# Patient Record
Sex: FEMALE | Race: WHITE | NOT HISPANIC OR LATINO | Employment: FULL TIME | URBAN - NONMETROPOLITAN AREA
[De-identification: names, ages, dates, MRNs, and addresses within clinical notes are randomized per-mention and may not be internally consistent; named-entity substitution may affect disease eponyms.]

---

## 2020-01-12 ENCOUNTER — HOSPITAL ENCOUNTER (EMERGENCY)
Facility: HOSPITAL | Age: 25
Discharge: HOME | End: 2020-01-12
Attending: EMERGENCY MEDICINE | Admitting: EMERGENCY MEDICINE

## 2020-01-12 VITALS
DIASTOLIC BLOOD PRESSURE: 90 MMHG | SYSTOLIC BLOOD PRESSURE: 130 MMHG | RESPIRATION RATE: 16 BRPM | HEIGHT: 62 IN | TEMPERATURE: 98 F | BODY MASS INDEX: 17.44 KG/M2 | WEIGHT: 94.8 LBS | HEART RATE: 97 BPM | OXYGEN SATURATION: 99 %

## 2020-01-12 DIAGNOSIS — S01.112A LACERATION OF LEFT EYEBROW, INITIAL ENCOUNTER: Primary | ICD-10-CM

## 2020-01-12 PROCEDURE — 99282 EMERGENCY DEPT VISIT SF MDM: CPT

## 2020-01-12 PROCEDURE — 25010000003 LIDOCAINE 1 % SOLUTION: Performed by: PHYSICIAN ASSISTANT

## 2020-01-12 RX ORDER — BACITRACIN ZINC 500 [USP'U]/G
OINTMENT TOPICAL ONCE
Status: COMPLETED | OUTPATIENT
Start: 2020-01-12 | End: 2020-01-12

## 2020-01-12 RX ORDER — LIDOCAINE HYDROCHLORIDE 10 MG/ML
10 INJECTION, SOLUTION INFILTRATION; PERINEURAL ONCE
Status: COMPLETED | OUTPATIENT
Start: 2020-01-12 | End: 2020-01-12

## 2020-01-12 RX ADMIN — BACITRACIN ZINC: 500 OINTMENT TOPICAL at 11:03

## 2020-01-12 RX ADMIN — LIDOCAINE HYDROCHLORIDE 10 ML: 10 INJECTION, SOLUTION INFILTRATION; PERINEURAL at 10:12

## 2020-01-12 NOTE — ED PROVIDER NOTES
Subjective   Patient is here with complaint of laceration above the left eye eyebrow area that occurred a day ago, no LOC accidentally hit it on the corner of a table no nausea vomiting no systemic complaints presents here for evaluation patient feels she is current with her tetanus status      History provided by:  Patient      Review of Systems   Constitutional: Negative.    HENT: Negative.    Eyes: Negative.    Respiratory: Negative.    Cardiovascular: Negative.    Gastrointestinal: Negative.    Genitourinary: Negative.    Skin: Positive for wound.   Neurological: Negative.    Psychiatric/Behavioral: Negative.    All other systems reviewed and are negative.      History reviewed. No pertinent past medical history.    No Known Allergies    History reviewed. No pertinent surgical history.    History reviewed. No pertinent family history.    Social History     Socioeconomic History   • Marital status: Single     Spouse name: Not on file   • Number of children: Not on file   • Years of education: Not on file   • Highest education level: Not on file   Tobacco Use   • Smoking status: Never Smoker   Substance and Sexual Activity   • Alcohol use: Yes     Comment: occassional   • Drug use: Never           Objective   Physical Exam   Constitutional: She is oriented to person, place, and time. She appears well-developed and well-nourished. No distress.   Afebrile nontoxic no acute distress   HENT:   Head: Normocephalic.   Mouth/Throat: Oropharynx is clear and moist.   Eyes: Pupils are equal, round, and reactive to light. Conjunctivae and EOM are normal.   Neck: Normal range of motion. Neck supple.   Cardiovascular: Normal rate and intact distal pulses.   Pulmonary/Chest: Effort normal and breath sounds normal.   Musculoskeletal: Normal range of motion.   Neurological: She is alert and oriented to person, place, and time. No cranial nerve deficit or sensory deficit. She exhibits normal muscle tone. Coordination normal.    Skin: Skin is warm and dry. Capillary refill takes less than 2 seconds. She is not diaphoretic.    1.5 cm superficial laceration left eyebrow   Psychiatric: She has a normal mood and affect. Her behavior is normal. Judgment and thought content normal.   Nursing note and vitals reviewed.      Laceration Repair  Date/Time: 1/12/2020 10:41 AM  Performed by: Vincent Gaston PA-C  Authorized by: Vincent Harris DO     Consent:     Consent obtained:  Verbal    Consent given by:  Patient  Anesthesia (see MAR for exact dosages):     Anesthesia method:  Local infiltration    Local anesthetic:  Lidocaine 1% w/o epi  Laceration details:     Location:  Face    Face location:  L eyebrow    Length (cm):  1.5    Depth (mm):  1  Repair type:     Repair type:  Simple  Pre-procedure details:     Preparation:  Patient was prepped and draped in usual sterile fashion  Treatment:     Area cleansed with:  Saline    Irrigation solution:  Sterile saline    Irrigation volume:  10    Irrigation method:  Syringe    Visualized foreign bodies/material removed: no    Skin repair:     Repair method:  Sutures    Suture size:  6-0    Suture material:  Nylon    Suture technique:  Simple interrupted    Number of sutures:  5  Approximation:     Approximation:  Close  Post-procedure details:     Dressing:  Antibiotic ointment               ED Course                                               MDM  Number of Diagnoses or Management Options     Amount and/or Complexity of Data Reviewed  Review and summarize past medical records: yes  Discuss the patient with other providers: yes    Risk of Complications, Morbidity, and/or Mortality  Presenting problems: low  Diagnostic procedures: low  Management options: low        Final diagnoses:   Laceration of left eyebrow, initial encounter            Vincent Gaston PA-C  01/12/20 1045

## 2020-03-30 ENCOUNTER — OFFICE VISIT (OUTPATIENT)
Dept: INTERNAL MEDICINE | Facility: CLINIC | Age: 25
End: 2020-03-30

## 2020-03-30 VITALS
DIASTOLIC BLOOD PRESSURE: 90 MMHG | WEIGHT: 95.12 LBS | BODY MASS INDEX: 17.51 KG/M2 | HEIGHT: 62 IN | HEART RATE: 113 BPM | OXYGEN SATURATION: 99 % | SYSTOLIC BLOOD PRESSURE: 125 MMHG | TEMPERATURE: 99.1 F

## 2020-03-30 DIAGNOSIS — Z86.19 FREQUENT INFECTIONS: ICD-10-CM

## 2020-03-30 DIAGNOSIS — I10 ESSENTIAL HYPERTENSION: Primary | ICD-10-CM

## 2020-03-30 DIAGNOSIS — R59.1 LYMPHADENOPATHY OF HEAD AND NECK: ICD-10-CM

## 2020-03-30 DIAGNOSIS — Z13.220 LIPID SCREENING: ICD-10-CM

## 2020-03-30 PROCEDURE — 99204 OFFICE O/P NEW MOD 45 MIN: CPT | Performed by: FAMILY MEDICINE

## 2020-03-30 RX ORDER — HYDROCHLOROTHIAZIDE 12.5 MG/1
12.5 TABLET ORAL DAILY
Qty: 30 TABLET | Refills: 2 | Status: SHIPPED | OUTPATIENT
Start: 2020-03-30 | End: 2020-08-14 | Stop reason: SDDI

## 2020-03-30 RX ORDER — NORGESTIMATE AND ETHINYL ESTRADIOL 7DAYSX3 28
KIT ORAL
COMMUNITY
Start: 2020-02-26 | End: 2022-06-09

## 2020-03-30 NOTE — PROGRESS NOTES
"Susan Dickinson is a 24 y.o. female.    Chief Complaint   Patient presents with   • Hypertension       HPI   Patient presents today to establish care.  Patient reports frequent infections.  Patient particularly mentions frequent GI infections and UTI's. 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  She was hospitalized for 4 days in 2015 at  and received IV antibiotics.  She reports she has been sick off and on and \"catches everything under the sun\".  She does work in a  as well and has done so for 2 years.  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  Patient also reports lymphadenopathy.    Patient also reports a h/o elevated BP.  She reports at an ER visit her BP was 140/90.  However, in our records, it shows 130/90.  She has not been worked up for renal artery stenosis in the past and has noticed elevated BP for years.  She does have mild headaches from times to time.      The following portions of the patient's history were reviewed and updated as appropriate: allergies, current medications, past family history, past medical history, past social history, past surgical history and problem list.     Past Medical History:   Diagnosis Date   • Sepsis (CMS/[facility])    • TSS (toxic shock syndrome) (CMS/[facility])        History reviewed. No pertinent surgical history.    Family History   Problem Relation Age of Onset   • Hypertension Mother    • Heart attack Father 53   • Hypertension Father    • Stroke Maternal Grandmother    • Hypertension Maternal Grandmother    • Hyperlipidemia Maternal Grandmother    • Heart attack Maternal Grandfather        Social History     Socioeconomic History   • Marital status: Single     Spouse name: Not on file   • Number of children: Not on file   • Years of education: Not on file   • Highest education level: Not on file   Tobacco Use   • Smoking status: Never Smoker   • Smokeless tobacco: Never Used   Substance and Sexual Activity   • Alcohol use: Yes     Comment: " "occassional   • Drug use: Never   • Sexual activity: Not Currently     Partners: Male     Birth control/protection: OCP       No Known Allergies      Current Outpatient Medications:   •  hydroCHLOROthiazide (HYDRODIURIL) 12.5 MG tablet, Take 1 tablet by mouth Daily., Disp: 30 tablet, Rfl: 2  •  TRI-SPRINTEC 0.18/0.215/0.25 MG-35 MCG per tablet, , Disp: , Rfl:     ROS    Review of Systems   Constitutional: Negative for chills, fatigue and fever.   HENT: Negative for congestion, postnasal drip and sore throat.    Eyes: Negative for blurred vision and visual disturbance.   Respiratory: Negative for cough, shortness of breath and wheezing.    Cardiovascular: Negative for chest pain and leg swelling.   Gastrointestinal: Negative for abdominal pain, constipation, diarrhea, nausea and vomiting.   Endocrine: Negative for cold intolerance and heat intolerance.   Genitourinary: Negative for dysuria and frequency.   Musculoskeletal: Negative for arthralgias and back pain.   Skin: Negative for color change and rash.   Allergic/Immunologic: Negative for environmental allergies.   Neurological: Positive for headache. Negative for weakness and numbness.   Hematological: Positive for adenopathy. Does not bruise/bleed easily.   Psychiatric/Behavioral: Negative for sleep disturbance and depressed mood. The patient is not nervous/anxious.        Vitals:    03/30/20 1439   BP: 125/90   BP Location: Left arm   Patient Position: Sitting   Cuff Size: Pediatric   Pulse: 113   Temp: 99.1 °F (37.3 °C)   TempSrc: Temporal   SpO2: 99%   Weight: 43.1 kg (95 lb 1.9 oz)   Height: 157.5 cm (62\")     Body mass index is 17.4 kg/m².    Physical Exam     Physical Exam   Constitutional: She is oriented to person, place, and time. She appears well-developed and well-nourished. No distress.   HENT:   Head: Normocephalic and atraumatic.   Right Ear: Tympanic membrane and external ear normal.   Left Ear: Tympanic membrane and external ear normal. "   Mouth/Throat: Oropharynx is clear and moist.   Eyes: Pupils are equal, round, and reactive to light. Conjunctivae and EOM are normal.   Neck: Normal range of motion. Neck supple.   Cardiovascular: Normal rate and regular rhythm.   No murmur heard.  Pulmonary/Chest: Effort normal and breath sounds normal. No respiratory distress. She has no wheezes.   Abdominal: Soft. Bowel sounds are normal. She exhibits no distension. There is no tenderness.   Musculoskeletal: Normal range of motion. She exhibits no edema.   Lymphadenopathy:     She has cervical adenopathy.        Left cervical: Superficial cervical adenopathy present.   Neurological: She is alert and oriented to person, place, and time. She displays normal reflexes. No cranial nerve deficit.   Skin: Skin is warm and dry.   Psychiatric: She has a normal mood and affect. Her behavior is normal.       Assessment/Plan    Problem List Items Addressed This Visit        Cardiovascular and Mediastinum    Essential hypertension - Primary    Relevant Medications    hydroCHLOROthiazide (HYDRODIURIL) 12.5 MG tablet    Other Relevant Orders    CBC & Differential    Comprehensive Metabolic Panel    TSH      Other Visit Diagnoses     Frequent infections        Relevant Orders    RADHA With / DsDNA, RNP, Sjogrens A / B, Smith    IgA    Lipid screening        Relevant Orders    Lipid Panel    Lymphadenopathy of head and neck        Relevant Orders    CBC & Differential    RADHA With / DsDNA, RNP, Sjogrens A / B, Castro    IgA        Patient is being started on HCTZ for BP.  Advised to take in the morning.  Encouraged to log BP at home for the next 2 months.  Advised I would like to obtain a renal ultrasound to rule out renal artery stenosis as a cause of HTN, but this will be delayed due to COVID 19 outbreak. Will obtain blood work for further evaluation of symptoms/frequent infections.  Will also obtain lipid screening as father  at age 53 from an MI.  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 Advised if she is concerned about bringing home COVID 19 from , or any other infection, at this time, she may want to obtain a letter from her grandmother's physician.  Will see back for physical with pap in 2 months.     New Medications Ordered This Visit   Medications   • hydroCHLOROthiazide (HYDRODIURIL) 12.5 MG tablet     Sig: Take 1 tablet by mouth Daily.     Dispense:  30 tablet     Refill:  2       No orders of the defined types were placed in this encounter.      Return in about 2 months (around 5/30/2020) for HTN, , Annual with pap.      Nubia Syed, DO

## 2020-03-31 LAB
ALBUMIN SERPL-MCNC: 4.5 G/DL (ref 3.5–5.2)
ALBUMIN/GLOB SERPL: 1.8 G/DL
ALP SERPL-CCNC: 57 U/L (ref 39–117)
ALT SERPL-CCNC: 11 U/L (ref 1–33)
ANA SER QL: NEGATIVE
AST SERPL-CCNC: 11 U/L (ref 1–32)
BASOPHILS # BLD AUTO: 0.07 10*3/MM3 (ref 0–0.2)
BASOPHILS NFR BLD AUTO: 0.6 % (ref 0–1.5)
BILIRUB SERPL-MCNC: 0.4 MG/DL (ref 0.2–1.2)
BUN SERPL-MCNC: 11 MG/DL (ref 6–20)
BUN/CREAT SERPL: 13.8 (ref 7–25)
CALCIUM SERPL-MCNC: 9.7 MG/DL (ref 8.6–10.5)
CHLORIDE SERPL-SCNC: 104 MMOL/L (ref 98–107)
CHOLEST SERPL-MCNC: 175 MG/DL (ref 0–200)
CO2 SERPL-SCNC: 26.1 MMOL/L (ref 22–29)
CREAT SERPL-MCNC: 0.8 MG/DL (ref 0.57–1)
EOSINOPHIL # BLD AUTO: 0.05 10*3/MM3 (ref 0–0.4)
EOSINOPHIL NFR BLD AUTO: 0.4 % (ref 0.3–6.2)
ERYTHROCYTE [DISTWIDTH] IN BLOOD BY AUTOMATED COUNT: 13.6 % (ref 12.3–15.4)
GLOBULIN SER CALC-MCNC: 2.5 GM/DL
GLUCOSE SERPL-MCNC: 82 MG/DL (ref 65–99)
HCT VFR BLD AUTO: 38.4 % (ref 34–46.6)
HDLC SERPL-MCNC: 65 MG/DL (ref 40–60)
HGB BLD-MCNC: 13 G/DL (ref 12–15.9)
IGA SERPL-MCNC: 125 MG/DL (ref 87–352)
IMM GRANULOCYTES # BLD AUTO: 0.03 10*3/MM3 (ref 0–0.05)
IMM GRANULOCYTES NFR BLD AUTO: 0.2 % (ref 0–0.5)
LDLC SERPL CALC-MCNC: 81 MG/DL (ref 0–100)
LYMPHOCYTES # BLD AUTO: 1.85 10*3/MM3 (ref 0.7–3.1)
LYMPHOCYTES NFR BLD AUTO: 15.1 % (ref 19.6–45.3)
MCH RBC QN AUTO: 29 PG (ref 26.6–33)
MCHC RBC AUTO-ENTMCNC: 33.9 G/DL (ref 31.5–35.7)
MCV RBC AUTO: 85.5 FL (ref 79–97)
MONOCYTES # BLD AUTO: 0.53 10*3/MM3 (ref 0.1–0.9)
MONOCYTES NFR BLD AUTO: 4.3 % (ref 5–12)
NEUTROPHILS # BLD AUTO: 9.74 10*3/MM3 (ref 1.7–7)
NEUTROPHILS NFR BLD AUTO: 79.4 % (ref 42.7–76)
NRBC BLD AUTO-RTO: 0 /100 WBC (ref 0–0.2)
PLATELET # BLD AUTO: 226 10*3/MM3 (ref 140–450)
POTASSIUM SERPL-SCNC: 3.9 MMOL/L (ref 3.5–5.2)
PROT SERPL-MCNC: 7 G/DL (ref 6–8.5)
RBC # BLD AUTO: 4.49 10*6/MM3 (ref 3.77–5.28)
SODIUM SERPL-SCNC: 143 MMOL/L (ref 136–145)
TRIGL SERPL-MCNC: 143 MG/DL (ref 0–150)
TSH SERPL DL<=0.005 MIU/L-ACNC: 1.7 UIU/ML (ref 0.27–4.2)
VLDLC SERPL CALC-MCNC: 28.6 MG/DL
WBC # BLD AUTO: 12.27 10*3/MM3 (ref 3.4–10.8)

## 2020-04-01 ENCOUNTER — TELEPHONE (OUTPATIENT)
Dept: INTERNAL MEDICINE | Facility: CLINIC | Age: 25
End: 2020-04-01

## 2020-04-02 RX ORDER — AMOXICILLIN AND CLAVULANATE POTASSIUM 875; 125 MG/1; MG/1
1 TABLET, FILM COATED ORAL EVERY 12 HOURS SCHEDULED
Qty: 20 TABLET | Refills: 0 | Status: SHIPPED | OUTPATIENT
Start: 2020-04-02 | End: 2020-08-14

## 2020-04-27 ENCOUNTER — E-VISIT (OUTPATIENT)
Dept: INTERNAL MEDICINE | Facility: CLINIC | Age: 25
End: 2020-04-27

## 2020-04-27 DIAGNOSIS — B37.31 VAGINAL CANDIDIASIS: Primary | ICD-10-CM

## 2020-04-27 PROCEDURE — 99421 OL DIG E/M SVC 5-10 MIN: CPT | Performed by: FAMILY MEDICINE

## 2020-04-28 PROBLEM — B37.31 VAGINAL CANDIDIASIS: Status: ACTIVE | Noted: 2020-04-28

## 2020-04-28 RX ORDER — FLUCONAZOLE 150 MG/1
150 TABLET ORAL ONCE
Qty: 1 TABLET | Refills: 0 | Status: SHIPPED | OUTPATIENT
Start: 2020-04-28 | End: 2020-04-28

## 2020-04-28 NOTE — PROGRESS NOTES
Susan Dickinson    1995  [id number]    I have reviewed the e-Visit questionnaire and patient's answers, my assessment and plan are as follows:    HPI  Patient complains of a less than 2 day h/o change in urine, vagina discharge that is white and like cottage cheese, vaginal odor.  Denies any dysuria, but admits to dyspareunia. It does not appear she has currently tried anything.      Review of Systems - Genito-Urinary ROS: positive for - dyspareunia, genital discharge and vaginal odor      Diagnoses and all orders for this visit:    Vaginal candidiasis    Other orders  -     fluconazole (Diflucan) 150 MG tablet; Take 1 tablet by mouth 1 (One) Time for 1 dose.    Patient likely has a vaginal yeast infection.  Will treat with diflucan for 1 dose.      Any medications prescribed have been sent electronically to   [id number] - [place], [place] - [address] - [phone]  - [phone] FX  [address] [place] [address]  Phone: [phone] Fax: [phone]      5 minutes were spent reviewing the patient's questionnaire, formulating a treatment plan, and relaying information to the patient via [employer].    Nubia Syed DO  04/28/20  8:23 AM

## 2020-05-05 RX ORDER — FLUCONAZOLE 150 MG/1
150 TABLET ORAL EVERY OTHER DAY
Qty: 2 TABLET | Refills: 0 | Status: SHIPPED | OUTPATIENT
Start: 2020-05-05 | End: 2020-08-14

## 2020-08-14 ENCOUNTER — RESULTS ENCOUNTER (OUTPATIENT)
Dept: INTERNAL MEDICINE | Facility: CLINIC | Age: 25
End: 2020-08-14

## 2020-08-14 ENCOUNTER — OFFICE VISIT (OUTPATIENT)
Dept: INTERNAL MEDICINE | Facility: CLINIC | Age: 25
End: 2020-08-14

## 2020-08-14 VITALS
OXYGEN SATURATION: 100 % | TEMPERATURE: 98.7 F | HEIGHT: 62 IN | BODY MASS INDEX: 18 KG/M2 | RESPIRATION RATE: 16 BRPM | SYSTOLIC BLOOD PRESSURE: 136 MMHG | HEART RATE: 111 BPM | DIASTOLIC BLOOD PRESSURE: 89 MMHG | WEIGHT: 97.8 LBS

## 2020-08-14 DIAGNOSIS — Z11.3 SCREEN FOR STD (SEXUALLY TRANSMITTED DISEASE): ICD-10-CM

## 2020-08-14 DIAGNOSIS — I10 ESSENTIAL HYPERTENSION: Primary | ICD-10-CM

## 2020-08-14 PROBLEM — B37.31 VAGINAL CANDIDIASIS: Status: RESOLVED | Noted: 2020-04-28 | Resolved: 2020-08-14

## 2020-08-14 PROCEDURE — 99213 OFFICE O/P EST LOW 20 MIN: CPT | Performed by: FAMILY MEDICINE

## 2020-08-14 RX ORDER — HYDROCHLOROTHIAZIDE 12.5 MG/1
12.5 TABLET ORAL DAILY
Qty: 30 TABLET | Refills: 5 | Status: SHIPPED | OUTPATIENT
Start: 2020-08-14 | End: 2020-11-02 | Stop reason: SDUPTHER

## 2020-08-14 NOTE — PROGRESS NOTES
Susan Dickinson is a 24 y.o. female.    Chief Complaint   Patient presents with   • Hypertension     patient states she stopped taking the Hydrochlorohiazide x 2 months ago   • STD testing     patient states she was informed by her ex-boyfriend that he had tested posted for STDs, patient states she has not been with him since 02/2020       HPI   Patient presents today requesting to be screened for potential STD's.  Denies symptoms.  Reports she has vaginal discharge only around time of period.  She denies any vaginal pain, pelvic pain, or lesions. Ex-boyfriend recently tested positive for an STD.        Patient has hypertension.  They are taking hydrochlorothiazide.  They have been compliant with medications.  The patient denies any side effects to the medication.  Blood pressure is controlled in the office today.  Blood pressure has been running unknown.  They are following a low salt diet.  They are active.    The following portions of the patient's history were reviewed and updated as appropriate: allergies, current medications, past family history, past medical history, past social history, past surgical history and problem list.     No Known Allergies      Current Outpatient Medications:   •  TRI-SPRINTEC 0.18/0.215/0.25 MG-35 MCG per tablet, , Disp: , Rfl:   •  hydroCHLOROthiazide (HYDRODIURIL) 12.5 MG tablet, Take 1 tablet by mouth Daily., Disp: 30 tablet, Rfl: 5    ROS    Review of Systems   Constitutional: Negative for chills, fatigue and fever.   HENT: Negative for congestion, postnasal drip and sore throat.    Respiratory: Negative for cough and shortness of breath.    Cardiovascular: Negative for chest pain and palpitations.   Gastrointestinal: Negative for abdominal pain, constipation, diarrhea, nausea and vomiting.   Musculoskeletal: Negative for arthralgias and back pain.   Neurological: Negative for weakness, numbness and headache.   Psychiatric/Behavioral: Negative for depressed mood. The  "patient is not nervous/anxious.        Vitals:    08/14/20 1340   BP: 136/89   BP Location: Left arm   Patient Position: Sitting   Cuff Size: Small Adult   Pulse: 111   Resp: 16   Temp: 98.7 °F (37.1 °C)   TempSrc: Temporal   SpO2: 100%   Weight: 44.4 kg (97 lb 12.8 oz)   Height: 157.5 cm (62\")     Body mass index is 17.89 kg/m².    Physical Exam     Physical Exam   Constitutional: She is oriented to person, place, and time. She appears well-developed and well-nourished. No distress.   HENT:   Head: Normocephalic and atraumatic.   Right Ear: External ear normal.   Left Ear: External ear normal.   Eyes: Conjunctivae and EOM are normal.   Cardiovascular: Regular rhythm. Tachycardia present.   No murmur heard.  Pulmonary/Chest: Effort normal and breath sounds normal. No respiratory distress. She has no wheezes.   Abdominal: Soft. Bowel sounds are normal. She exhibits no distension. There is no tenderness.   Neurological: She is alert and oriented to person, place, and time. No cranial nerve deficit.   Skin: Skin is warm and dry.   Psychiatric: She has a normal mood and affect. Her behavior is normal.       Assessment/Plan    Problem List Items Addressed This Visit        Cardiovascular and Mediastinum    Essential hypertension - Primary     Borderline elevated due to noncompliance.  Encouraged patient to start back on HCTZ.          Relevant Medications    hydroCHLOROthiazide (HYDRODIURIL) 12.5 MG tablet      Other Visit Diagnoses     Screen for STD (sexually transmitted disease)        Relevant Orders    Hepatitis C Antibody    HIV-1 / O / 2 Ag / Antibody 4th Generation    STI/STD PANEL SWAB ([facility]) - Swab, Vagina          New Medications Ordered This Visit   Medications   • hydroCHLOROthiazide (HYDRODIURIL) 12.5 MG tablet     Sig: Take 1 tablet by mouth Daily.     Dispense:  30 tablet     Refill:  5       No orders of the defined types were placed in this encounter.      Return in about 6 months (around 2/14/2021) " for HTN.    Nubia Syed, DO

## 2020-08-15 LAB
HCV AB S/CO SERPL IA: 0.1 S/CO RATIO (ref 0–0.9)
HIV 1+2 AB+HIV1 P24 AG SERPL QL IA: NON REACTIVE

## 2020-08-18 RX ORDER — METRONIDAZOLE 500 MG/1
500 TABLET ORAL 2 TIMES DAILY
Qty: 14 TABLET | Refills: 0 | Status: SHIPPED | OUTPATIENT
Start: 2020-08-18 | End: 2020-09-16 | Stop reason: SDUPTHER

## 2020-08-18 RX ORDER — AZITHROMYCIN 500 MG/1
1000 TABLET, FILM COATED ORAL ONCE
Qty: 2 TABLET | Refills: 0 | Status: SHIPPED | OUTPATIENT
Start: 2020-08-18 | End: 2020-08-18

## 2020-09-16 RX ORDER — METRONIDAZOLE 250 MG/1
250 TABLET ORAL 3 TIMES DAILY
Qty: 21 TABLET | Refills: 0 | Status: SHIPPED | OUTPATIENT
Start: 2020-09-16 | End: 2020-09-23

## 2020-11-05 RX ORDER — HYDROCHLOROTHIAZIDE 12.5 MG/1
12.5 TABLET ORAL DAILY
Qty: 30 TABLET | Refills: 5 | Status: SHIPPED | OUTPATIENT
Start: 2020-11-05 | End: 2021-02-26

## 2021-02-26 ENCOUNTER — OFFICE VISIT (OUTPATIENT)
Dept: INTERNAL MEDICINE | Facility: CLINIC | Age: 26
End: 2021-02-26

## 2021-02-26 VITALS
DIASTOLIC BLOOD PRESSURE: 92 MMHG | SYSTOLIC BLOOD PRESSURE: 142 MMHG | BODY MASS INDEX: 20.3 KG/M2 | HEIGHT: 62 IN | HEART RATE: 114 BPM | TEMPERATURE: 98.7 F | WEIGHT: 110.3 LBS | OXYGEN SATURATION: 99 %

## 2021-02-26 DIAGNOSIS — R00.2 PALPITATIONS: ICD-10-CM

## 2021-02-26 DIAGNOSIS — I10 ESSENTIAL HYPERTENSION: Primary | ICD-10-CM

## 2021-02-26 DIAGNOSIS — R42 DIZZINESS: ICD-10-CM

## 2021-02-26 DIAGNOSIS — R00.0 TACHYCARDIA: ICD-10-CM

## 2021-02-26 PROCEDURE — 99214 OFFICE O/P EST MOD 30 MIN: CPT | Performed by: FAMILY MEDICINE

## 2021-02-26 RX ORDER — LISINOPRIL AND HYDROCHLOROTHIAZIDE 12.5; 1 MG/1; MG/1
1 TABLET ORAL DAILY
Qty: 30 TABLET | Refills: 5 | Status: SHIPPED | OUTPATIENT
Start: 2021-02-26 | End: 2021-04-06 | Stop reason: SDUPTHER

## 2021-02-26 NOTE — PROGRESS NOTES
"Susan Dickinson is a 25 y.o. female.    Chief Complaint   Patient presents with   • Hypertension       HPI   Patient has hypertension.  They are taking hydrochlorothiazide.  They have been compliant with medications.  The patient denies any side effects to the medication.  Blood pressure is not controlled in the office today.  Blood pressure has been running higher at home and at work over the last week.  It is always higher in the morning and improves after she takes her blood pressure medication around 10 AM.  She is following a low salt diet.  She is active she reports elevated HR into 160's or 120's.  She admits to palpitations.  She reports dizziness off and on.  She is drinking 2 mt dew a day and 1 tea.  She does not drink very much water.    The following portions of the patient's history were reviewed and updated as appropriate: allergies, current medications, past family history, past medical history, past social history, past surgical history and problem list.     No Known Allergies      Current Outpatient Medications:   •  TRI-SPRINTEC 0.18/0.215/0.25 MG-35 MCG per tablet, , Disp: , Rfl:   •  lisinopril-hydrochlorothiazide (Zestoretic) 10-12.5 MG per tablet, Take 1 tablet by mouth Daily., Disp: 30 tablet, Rfl: 5    ROS    Review of Systems   Constitutional: Negative for chills, fatigue and fever.   Respiratory: Positive for chest tightness. Negative for cough and shortness of breath.    Cardiovascular: Positive for palpitations. Negative for chest pain.   Gastrointestinal: Negative for abdominal pain, constipation, diarrhea, nausea and vomiting.   Psychiatric/Behavioral: Positive for stress. Negative for depressed mood. The patient is nervous/anxious.        Vitals:    02/26/21 1137   BP: 142/92   BP Location: Left arm   Patient Position: Sitting   Cuff Size: Adult   Pulse: 114   Temp: 98.7 °F (37.1 °C)   TempSrc: Temporal   SpO2: 99%   Weight: 50 kg (110 lb 4.8 oz)   Height: 157.5 cm (62\")     Body " mass index is 20.17 kg/m².    Physical Exam     Physical Exam  Constitutional:       General: She is not in acute distress.     Appearance: She is well-developed.   HENT:      Head: Normocephalic and atraumatic.      Right Ear: External ear normal.      Left Ear: External ear normal.   Eyes:      Extraocular Movements: Extraocular movements intact.      Conjunctiva/sclera: Conjunctivae normal.   Cardiovascular:      Rate and Rhythm: Regular rhythm. Tachycardia present.      Heart sounds: No murmur.   Pulmonary:      Effort: Pulmonary effort is normal. No respiratory distress.      Breath sounds: Normal breath sounds. No wheezing.   Abdominal:      General: Bowel sounds are normal. There is no distension.      Palpations: Abdomen is soft.      Tenderness: There is no abdominal tenderness.   Skin:     General: Skin is warm and dry.   Neurological:      Mental Status: She is alert and oriented to person, place, and time.      Cranial Nerves: No cranial nerve deficit.   Psychiatric:         Mood and Affect: Mood normal.         Behavior: Behavior normal.         Assessment/Plan    Problems Addressed this Visit        Cardiac and Vasculature    Essential hypertension - Primary    Relevant Medications    lisinopril-hydrochlorothiazide (Zestoretic) 10-12.5 MG per tablet    Other Relevant Orders    CBC & Differential    Comprehensive Metabolic Panel    TSH    T4, Free      Other Visit Diagnoses     Tachycardia        Relevant Orders    Holter Monitor - 72 Hour Up To 15 Days    CBC & Differential    Comprehensive Metabolic Panel    TSH    Magnesium    T4, Free    Palpitations        Relevant Orders    Holter Monitor - 72 Hour Up To 15 Days    CBC & Differential    Comprehensive Metabolic Panel    TSH    Magnesium    T4, Free    Dizziness        Relevant Orders    Holter Monitor - 72 Hour Up To 15 Days           Blood pressure is uncontrolled in office today.  Patient is being changed to lisinopril-hydrochlorothiazide.   Discussed potential side effects of the medication today.  Patient advised that she may want to try taking the medication at night.  We will also place a Holter monitor on the patient today.  She has been encouraged to wear this for 5 days.  Advised to document any episodes of tachycardia, palpitations, dizziness, etc. that she may have over the next 5 days so that we can see if that is associated with any abnormalities on the Holter monitor.  We will also obtain updated labs.    New Medications Ordered This Visit   Medications   • lisinopril-hydrochlorothiazide (Zestoretic) 10-12.5 MG per tablet     Sig: Take 1 tablet by mouth Daily.     Dispense:  30 tablet     Refill:  5       No orders of the defined types were placed in this encounter.      Return in about 1 month (around 3/26/2021) for HTN.    Nubia Syed,

## 2021-04-06 ENCOUNTER — OFFICE VISIT (OUTPATIENT)
Dept: INTERNAL MEDICINE | Facility: CLINIC | Age: 26
End: 2021-04-06

## 2021-04-06 VITALS
HEART RATE: 94 BPM | HEIGHT: 62 IN | OXYGEN SATURATION: 100 % | TEMPERATURE: 98.2 F | BODY MASS INDEX: 19.01 KG/M2 | WEIGHT: 103.3 LBS | DIASTOLIC BLOOD PRESSURE: 82 MMHG | SYSTOLIC BLOOD PRESSURE: 132 MMHG

## 2021-04-06 DIAGNOSIS — Z01.30 BLOOD PRESSURE CHECK ON ORAL CONTRACEPTIVES: Primary | ICD-10-CM

## 2021-04-06 DIAGNOSIS — Z79.3 BLOOD PRESSURE CHECK ON ORAL CONTRACEPTIVES: Primary | ICD-10-CM

## 2021-04-06 DIAGNOSIS — I10 ESSENTIAL HYPERTENSION: ICD-10-CM

## 2021-04-06 PROCEDURE — 99213 OFFICE O/P EST LOW 20 MIN: CPT | Performed by: FAMILY MEDICINE

## 2021-04-06 RX ORDER — LISINOPRIL AND HYDROCHLOROTHIAZIDE 12.5; 1 MG/1; MG/1
1 TABLET ORAL DAILY
Qty: 90 TABLET | Refills: 3 | Status: SHIPPED | OUTPATIENT
Start: 2021-04-06 | End: 2022-06-09

## 2021-04-06 NOTE — PROGRESS NOTES
"Susan Dickinson is a 25 y.o. female.    Chief Complaint   Patient presents with   • Hypertension       HPI   Patient has hypertension and is on an oral contraceptive.  They are taking lisinopril (Prinivil) and hydrochlorothiazide.  They have been compliant with medications.  The patient denies any side effects to the medication.  Blood pressure is controlled in the office today.  Blood pressure has been running unknown at work or home.  They are following a low salt diet.  They are active.    The following portions of the patient's history were reviewed and updated as appropriate: allergies, current medications, past family history, past medical history, past social history, past surgical history and problem list.     No Known Allergies      Current Outpatient Medications:   •  lisinopril-hydrochlorothiazide (Zestoretic) 10-12.5 MG per tablet, Take 1 tablet by mouth Daily., Disp: 90 tablet, Rfl: 3  •  TRI-SPRINTEC 0.18/0.215/0.25 MG-35 MCG per tablet, , Disp: , Rfl:     ROS    Review of Systems   Constitutional: Negative for chills and fever.   Eyes: Negative for visual disturbance.   Respiratory: Negative for cough and shortness of breath.    Cardiovascular: Negative for chest pain.   Gastrointestinal: Negative for abdominal pain, constipation, diarrhea, nausea and vomiting.   Neurological: Negative for headache.       Vitals:    04/06/21 0831   BP: 132/82   BP Location: Left arm   Patient Position: Sitting   Cuff Size: Adult   Pulse: 94   Temp: 98.2 °F (36.8 °C)   TempSrc: Temporal   SpO2: 100%   Weight: 46.9 kg (103 lb 4.8 oz)   Height: 157.5 cm (62\")     Body mass index is 18.89 kg/m².    Physical Exam     Physical Exam  Constitutional:       General: She is not in acute distress.     Appearance: She is well-developed.   HENT:      Head: Normocephalic and atraumatic.      Right Ear: External ear normal.      Left Ear: External ear normal.   Eyes:      Extraocular Movements: Extraocular movements intact.      " Conjunctiva/sclera: Conjunctivae normal.   Cardiovascular:      Rate and Rhythm: Normal rate and regular rhythm.      Heart sounds: No murmur heard.     Pulmonary:      Effort: Pulmonary effort is normal. No respiratory distress.      Breath sounds: Normal breath sounds. No wheezing.   Musculoskeletal:      Right lower leg: No edema.      Left lower leg: No edema.   Neurological:      Mental Status: She is alert and oriented to person, place, and time.      Cranial Nerves: No cranial nerve deficit.   Psychiatric:         Mood and Affect: Mood normal.         Behavior: Behavior normal.         Assessment/Plan    Problems Addressed this Visit        Cardiac and Vasculature    Essential hypertension    Relevant Medications    lisinopril-hydrochlorothiazide (Zestoretic) 10-12.5 MG per tablet    Blood pressure check on oral contraceptives - Primary        BP controlled in office today.  Will continue lisinopril-HCTZ.  Will continue OC as well.      New Medications Ordered This Visit   Medications   • lisinopril-hydrochlorothiazide (Zestoretic) 10-12.5 MG per tablet     Sig: Take 1 tablet by mouth Daily.     Dispense:  90 tablet     Refill:  3       No orders of the defined types were placed in this encounter.      Return in about 6 months (around 10/6/2021) for HTN.    Nubia Syed DO

## 2021-04-07 PROCEDURE — 93244 EXT ECG>48HR<7D REV&INTERPJ: CPT | Performed by: FAMILY MEDICINE

## 2021-04-23 ENCOUNTER — PATIENT MESSAGE (OUTPATIENT)
Dept: INTERNAL MEDICINE | Facility: CLINIC | Age: 26
End: 2021-04-23

## 2021-04-23 NOTE — TELEPHONE ENCOUNTER
From: Susan Dickinson  To: Nubia Syed DO  Sent: 4/23/2021 1:46 PM EDT  Subject: Non-Urgent Medical Question    Good afternoon,  I was running a low grade temperature this morning, (I attached a picture of the thermometer) I feel more than normal. Nothing seems to be off.  We are not able to go in to work if it is 100.4 or higher, nor can we be medicated. I have been rotating Tylenol and Ibuprofen. Which had made my temp go down. Like I said due to Covid they will not allow us around students, last time I had a low grade with no other symptoms the [facility] wrote me a doctor's note! Would you all be able to send a letter through the portal just so I can send in to my workplace that I've gotten in contact with you over my temp?

## 2021-07-14 ENCOUNTER — PATIENT MESSAGE (OUTPATIENT)
Dept: INTERNAL MEDICINE | Facility: CLINIC | Age: 26
End: 2021-07-14

## 2021-07-14 DIAGNOSIS — Z11.3 SCREEN FOR STD (SEXUALLY TRANSMITTED DISEASE): Primary | ICD-10-CM

## 2021-07-14 NOTE — TELEPHONE ENCOUNTER
From: Susan Dickinson  To: Nubia Syed DO  Sent: 7/14/2021 11:31 AM EDT  Subject: Non-Urgent Medical Question    Good morning Dr. Syed,  I just wanted to get your opinion.  I stopped taking my birth control about 2/3 months ago, and just recently started it back roughly 2.5 weeks ago.   I had been taking my birth control a little shy of a week, and ended up taking a plan B pill 2 times in one week. I started spotting last Monday (July 5th) it was very light spotting then progressively got a little more heavy. Nothing like my normal period though. Today is the 14th and I'm still slightly bleeding.   Do you think the combination of my BC pills and the Plan B has caused me to bleed this long? Or do you think there could be more behind it?

## 2021-07-15 ENCOUNTER — CLINICAL SUPPORT (OUTPATIENT)
Dept: INTERNAL MEDICINE | Facility: CLINIC | Age: 26
End: 2021-07-15

## 2021-07-19 ENCOUNTER — PATIENT MESSAGE (OUTPATIENT)
Dept: INTERNAL MEDICINE | Facility: CLINIC | Age: 26
End: 2021-07-19

## 2021-07-19 RX ORDER — DOXYCYCLINE HYCLATE 100 MG/1
100 CAPSULE ORAL 2 TIMES DAILY
Qty: 14 CAPSULE | Refills: 0 | Status: SHIPPED | OUTPATIENT
Start: 2021-07-19 | End: 2022-05-13

## 2021-07-20 LAB
A VAGINAE DNA VAG QL NAA+PROBE: ABNORMAL SCORE
BVAB2 DNA VAG QL NAA+PROBE: ABNORMAL SCORE
C ALBICANS DNA VAG QL NAA+PROBE: NEGATIVE
C GLABRATA DNA VAG QL NAA+PROBE: NEGATIVE
C TRACH DNA VAG QL NAA+PROBE: POSITIVE
HSV1 DNA SPEC QL NAA+PROBE: NEGATIVE
HSV2 DNA SPEC QL NAA+PROBE: NEGATIVE
MEGA1 DNA VAG QL NAA+PROBE: ABNORMAL SCORE
N GONORRHOEA DNA VAG QL NAA+PROBE: NEGATIVE
T VAGINALIS DNA VAG QL NAA+PROBE: NEGATIVE

## 2021-08-13 RX ORDER — DOXYCYCLINE HYCLATE 100 MG/1
CAPSULE ORAL
Qty: 14 CAPSULE | Refills: 0 | OUTPATIENT
Start: 2021-08-13

## 2022-01-06 RX ORDER — LISINOPRIL AND HYDROCHLOROTHIAZIDE 12.5; 1 MG/1; MG/1
1 TABLET ORAL DAILY
Qty: 90 TABLET | Refills: 3 | OUTPATIENT
Start: 2022-01-06

## 2022-05-13 ENCOUNTER — TELEPHONE (OUTPATIENT)
Dept: INTERNAL MEDICINE | Facility: CLINIC | Age: 27
End: 2022-05-13

## 2022-05-13 DIAGNOSIS — Z32.00 POSSIBLE PREGNANCY: Primary | ICD-10-CM

## 2022-05-13 NOTE — TELEPHONE ENCOUNTER
Serum pregnancy test ordered. She will need to call and schedule an appt with OB/Gyn.  If she is still taking lisinopril-HCTZ, she will need to stop and should be changed to a different medication during pregnancy.  Will need to make an appt for medication change.

## 2022-05-13 NOTE — TELEPHONE ENCOUNTER
Caller: Susan Dickinson    Relationship: Self    Best call back number: [phone]    What orders are you requesting (i.e. lab or imaging): CHECK FOR PREGNANCY     In what timeframe would the patient need to come in: 05/13/2022    Additional notes:   PATIENT STATED THAT SHE HAD 3 PREGNANCY TEST TODAY 05/13/2022 AND PATIENT WOULD LIKE TO HAVE BLOOD WORK TAKEN TO SEE IF SHE IS FOR SURE PREGNANT

## 2022-05-16 ENCOUNTER — TELEPHONE (OUTPATIENT)
Dept: INTERNAL MEDICINE | Facility: CLINIC | Age: 27
End: 2022-05-16

## 2022-05-16 LAB — HCG INTACT+B SERPL-ACNC: 193 MIU/ML

## 2022-05-16 NOTE — TELEPHONE ENCOUNTER
Caller: Susan Dickinson    Relationship: Self    Best call back number: [phone]    What test was performed: BLOOD TEST    When was the test performed: 5/16/2022    Where was the test performed: IN OFFICE    Additional notes: PATIENT WOULD LIKE TO HAVE A CALL AS SOON AS RESULTS ARE AVAILABLE.

## 2022-05-26 ENCOUNTER — TELEPHONE (OUTPATIENT)
Dept: INTERNAL MEDICINE | Facility: CLINIC | Age: 27
End: 2022-05-26

## 2022-05-26 NOTE — TELEPHONE ENCOUNTER
Caller: Susan Dickinson    Relationship: Self    Best call back number:[phone]    What orders are you requesting (i.e. lab or imaging): ULTRASOUND    In what timeframe would the patient need to come in: ASAP    Where will you receive your lab/imaging services: [facility] IN [place] OFF [address]    Additional notes: PATIENT CAN GET IN TOMORROW FOR ULTRASOUND HOWEVER NEEDS IT SENT TODAY SO SHE CAN GET THE SPOT FOR TOMORROW MORNING.

## 2022-05-27 ENCOUNTER — TELEPHONE (OUTPATIENT)
Dept: OBSTETRICS AND GYNECOLOGY | Facility: CLINIC | Age: 27
End: 2022-05-27

## 2022-05-27 ENCOUNTER — LAB (OUTPATIENT)
Dept: OBSTETRICS AND GYNECOLOGY | Facility: CLINIC | Age: 27
End: 2022-05-27

## 2022-05-27 DIAGNOSIS — Z32.00 POSSIBLE PREGNANCY, NOT CONFIRMED: Primary | ICD-10-CM

## 2022-05-27 DIAGNOSIS — Z32.00 POSSIBLE PREGNANCY, NOT CONFIRMED: ICD-10-CM

## 2022-05-27 NOTE — TELEPHONE ENCOUNTER
Will be an LOS pt    Coming in a few weeks for her NOB visit, she has history of miscarriage and is just on edge about waiting. Wondering if she can get a referral to have an ultrasound done at another location or if we can scan her here before her NOB visit.

## 2022-05-27 NOTE — TELEPHONE ENCOUNTER
Returned call to patient. Patient states LMP 4/18/22, +UPT 5/13/22, Blood Hcg 193 5/16/22. Patient wanting an US now due to hx of miscarriage. She is anxious. Pt denies pain, vaginal bleeding, cramping. No problems at all. Pt advised it may be too soon due to being approx 5 weeks. If she would like we could do another hcg, prog, and check blood type prior to scheduled appointment with Miranda. She will come in today for blood work. Advised to call if she begins to experience any problems. Pt VU. Orders placed.

## 2022-05-28 LAB
ABO GROUP BLD: NORMAL
HCG INTACT+B SERPL-ACNC: NORMAL MIU/ML
PROGEST SERPL-MCNC: 18.7 NG/ML
RH BLD: POSITIVE

## 2022-06-09 ENCOUNTER — INITIAL PRENATAL (OUTPATIENT)
Dept: OBSTETRICS AND GYNECOLOGY | Facility: CLINIC | Age: 27
End: 2022-06-09

## 2022-06-09 VITALS — WEIGHT: 98.2 LBS | BODY MASS INDEX: 17.96 KG/M2 | DIASTOLIC BLOOD PRESSURE: 82 MMHG | SYSTOLIC BLOOD PRESSURE: 122 MMHG

## 2022-06-09 DIAGNOSIS — Z3A.01 LESS THAN 8 WEEKS GESTATION OF PREGNANCY: Primary | ICD-10-CM

## 2022-06-09 DIAGNOSIS — I10 ESSENTIAL HYPERTENSION: ICD-10-CM

## 2022-06-09 PROCEDURE — 0501F PRENATAL FLOW SHEET: CPT | Performed by: OBSTETRICS & GYNECOLOGY

## 2022-06-09 RX ORDER — PRENATAL VIT NO.126/IRON/FOLIC 28MG-0.8MG
TABLET ORAL DAILY
COMMUNITY

## 2022-06-09 NOTE — PROGRESS NOTES
Initial ob visit     CC- Here for care of pregnancy        Susan Dickinson is a 26 y.o. female, , who presents for her first obstetrical visit.  Her last LMP was Patient's last menstrual period was 2022..    # 1 - Date: None, Sex: None, Weight: None, GA: None, Delivery: None, Apgar1: None, Apgar5: None, Living: None, Birth Comments: None    Current obstetric complaints : none   Initial positive test date : 2022  Location : [facility]    Prior obstetric issues, None  Potential pregnancy concerns: h/o HTN  Family history of genetic issues (includes FOB): no  Prior infections concerning in pregnancy (Rash, fever in last 2 weeks): no  Varicella Hx - no  Prior testing for Cystic Fibrosis Carrier or Sickle Cell Trait- no  Prepregnancy BMI - There is no height or weight on file to calculate BMI.  Hx of HSV for patient or partner : no    Additional Pertinent History   Last Pap : NA- patient has never had a pap smear  Last Completed Pap Smear     This patient has no relevant Health Maintenance data.        History of abnormal Pap smear: NA  Family history of uterine, colon, breast, or ovarian cancer: yes - MGGM- breast CA  Performs monthly Self-Breast Exam: no  Exercises Regularly: no  Feelings of Anxiety or Depression: no  Tobacco Usage?: No     The additional following portions of the patient's history were reviewed and updated as appropriate: allergies, current medications, past family history, past medical history, past social history and past surgical history.    Review of Systems   Review of Systems  Constitutional : Nausea, fatigue    : Vaginal bleeding, cramping  Breast Tenderness   All systems reviewed     LMP 2022     Physical Exam  General Appearance:    Alert, cooperative, in no acute distress   Head:    Normocephalic, without obvious abnormality, atraumatic   Eyes:            Lids and lashes normal, conjunctivae and sclerae normal, no icterus, no pallor, corneas clear   Ears:    Ears appear  intact with no abnormalities noted       Neck:      Neck without masses or thyromegaly    Abdomen:    Soft without masses or tenderness   :    Normal vulva, vagina, cervix       Neck:   No adenopathy, supple, trachea midline, no thyromegaly   Back:     No kyphosis present, no scoliosis present,                       Extremities:   Moves all extremities well, no edema, no cyanosis   Skin:   No bleeding, bruising or rash   Lymph nodes:   No palpable adenopathy   Neurologic:   Sensation intact, A&O times 3        Assessment & Plan   Assessment     Problem List Items Addressed This Visit    None         1. Pregnancy at 7w3d  2. CHTN    Plan     1. Reviewed routine prenatal care with the office and educational materials given  2. Counseled on genetic testing options including CF DNA, carrier testing including CF, SMA, and Fragile X,  and NT screening.  3. CHTN - not currently on meds nor has been in past year.    4. Tonyk katherine BOY      Jeni Lo RN  06/09/2022

## 2022-06-13 LAB
ABO GROUP BLD: ABNORMAL
AMPHETAMINES UR QL SCN: NEGATIVE NG/ML
APPEARANCE UR: CLEAR
BACTERIA #/AREA URNS HPF: NORMAL /[HPF]
BACTERIA UR CULT: NO GROWTH
BACTERIA UR CULT: NORMAL
BARBITURATES UR QL SCN: NEGATIVE NG/ML
BASOPHILS # BLD AUTO: 0.1 X10E3/UL (ref 0–0.2)
BASOPHILS NFR BLD AUTO: 1 %
BENZODIAZ UR QL SCN: NEGATIVE NG/ML
BILIRUB UR QL STRIP: NEGATIVE
BLD GP AB SCN SERPL QL: NEGATIVE
BZE UR QL SCN: NEGATIVE NG/ML
C TRACH RRNA SPEC QL NAA+PROBE: NEGATIVE
CANNABINOIDS UR QL SCN: NEGATIVE NG/ML
CASTS URNS QL MICRO: NORMAL /LPF
COLOR UR: YELLOW
CREAT UR-MCNC: 25.5 MG/DL (ref 20–300)
EOSINOPHIL # BLD AUTO: 0.1 X10E3/UL (ref 0–0.4)
EOSINOPHIL NFR BLD AUTO: 1 %
EPI CELLS #/AREA URNS HPF: NORMAL /HPF (ref 0–10)
ERYTHROCYTE [DISTWIDTH] IN BLOOD BY AUTOMATED COUNT: 14.4 % (ref 11.7–15.4)
GLUCOSE UR QL STRIP: NEGATIVE
HBV SURFACE AG SERPL QL IA: NEGATIVE
HCT VFR BLD AUTO: 34.5 % (ref 34–46.6)
HCV AB S/CO SERPL IA: 0.1 S/CO RATIO (ref 0–0.9)
HGB BLD-MCNC: 11.5 G/DL (ref 11.1–15.9)
HGB UR QL STRIP: NEGATIVE
HIV 1+2 AB+HIV1 P24 AG SERPL QL IA: NON REACTIVE
IMM GRANULOCYTES # BLD AUTO: 0 X10E3/UL (ref 0–0.1)
IMM GRANULOCYTES NFR BLD AUTO: 0 %
KETONES UR QL STRIP: NEGATIVE
LABORATORY COMMENT REPORT: NORMAL
LEUKOCYTE ESTERASE UR QL STRIP: NEGATIVE
LYMPHOCYTES # BLD AUTO: 1.5 X10E3/UL (ref 0.7–3.1)
LYMPHOCYTES NFR BLD AUTO: 14 %
MCH RBC QN AUTO: 26.9 PG (ref 26.6–33)
MCHC RBC AUTO-ENTMCNC: 33.3 G/DL (ref 31.5–35.7)
MCV RBC AUTO: 81 FL (ref 79–97)
METHADONE UR QL SCN: NEGATIVE NG/ML
MICRO URNS: NORMAL
MICRO URNS: NORMAL
MONOCYTES # BLD AUTO: 0.7 X10E3/UL (ref 0.1–0.9)
MONOCYTES NFR BLD AUTO: 6 %
N GONORRHOEA RRNA SPEC QL NAA+PROBE: NEGATIVE
NEUTROPHILS # BLD AUTO: 8.7 X10E3/UL (ref 1.4–7)
NEUTROPHILS NFR BLD AUTO: 78 %
NITRITE UR QL STRIP: NEGATIVE
OPIATES UR QL SCN: NEGATIVE NG/ML
OXYCODONE+OXYMORPHONE UR QL SCN: NEGATIVE NG/ML
PCP UR QL: NEGATIVE NG/ML
PH UR STRIP: 6.5 [PH] (ref 5–7.5)
PH UR: 6.5 [PH] (ref 4.5–8.9)
PLATELET # BLD AUTO: 222 X10E3/UL (ref 150–450)
PROPOXYPH UR QL SCN: NEGATIVE NG/ML
PROT UR QL STRIP: NEGATIVE
RBC # BLD AUTO: 4.27 X10E6/UL (ref 3.77–5.28)
RBC #/AREA URNS HPF: NORMAL /HPF (ref 0–2)
RH BLD: POSITIVE
RPR SER QL: NON REACTIVE
RUBV IGG SERPL IA-ACNC: 2.82 INDEX
SP GR UR STRIP: 1.01 (ref 1–1.03)
UROBILINOGEN UR STRIP-MCNC: 0.2 MG/DL (ref 0.2–1)
WBC # BLD AUTO: 11 X10E3/UL (ref 3.4–10.8)
WBC #/AREA URNS HPF: NORMAL /HPF (ref 0–5)

## 2022-06-14 LAB — REF LAB TEST METHOD: NORMAL

## 2022-06-28 ENCOUNTER — TELEPHONE (OUTPATIENT)
Dept: OBSTETRICS AND GYNECOLOGY | Facility: CLINIC | Age: 27
End: 2022-06-28

## 2022-06-28 NOTE — TELEPHONE ENCOUNTER
Pt wants to know if she can take extra strength tylenol when she is pregnant. She currently has a headache and was wanting to take something for it     Please advise

## 2022-06-28 NOTE — TELEPHONE ENCOUNTER
10w1d. C/o headache. Asking if ok to take tylenol.     Instructed fine to use 2 regular or 1 extra strength tylenol as directed. She jose m. Encouraged patient to hydrate and can try caffeine for relief as well. She jose m.

## 2022-07-07 ENCOUNTER — ROUTINE PRENATAL (OUTPATIENT)
Dept: OBSTETRICS AND GYNECOLOGY | Facility: CLINIC | Age: 27
End: 2022-07-07

## 2022-07-07 VITALS — WEIGHT: 100 LBS | DIASTOLIC BLOOD PRESSURE: 90 MMHG | BODY MASS INDEX: 18.29 KG/M2 | SYSTOLIC BLOOD PRESSURE: 130 MMHG

## 2022-07-07 DIAGNOSIS — Z3A.11 11 WEEKS GESTATION OF PREGNANCY: Primary | ICD-10-CM

## 2022-07-07 PROBLEM — Z79.3: Status: RESOLVED | Noted: 2021-04-06 | Resolved: 2022-07-07

## 2022-07-07 PROBLEM — Z01.30: Status: RESOLVED | Noted: 2021-04-06 | Resolved: 2022-07-07

## 2022-07-07 LAB
EXPIRATION DATE: NORMAL
GLUCOSE UR STRIP-MCNC: NEGATIVE MG/DL
Lab: NORMAL
PROT UR STRIP-MCNC: NEGATIVE MG/DL

## 2022-07-07 PROCEDURE — 99212 OFFICE O/P EST SF 10 MIN: CPT | Performed by: NURSE PRACTITIONER

## 2022-07-07 NOTE — PROGRESS NOTES
OB FOLLOW UP  CC- Here for care of pregnancy        Susan Dickisnon is a 26 y.o.  11w3d patient being seen today for her obstetrical follow up visit. Patient reports nausea, vomiting, and change in BMs intermittently over the last three days; much better now.  She also reports that her headaches have been more frequent and worse.     Reviewed NOB labs with patient, normal.     Her prenatal care is complicated by (and status) : H/o CHTN- stopped Lisinopril and HCTZ about 1 year ago  Patient Active Problem List   Diagnosis   • Essential hypertension       Desires genetic testing?: No  Ultrasound Today: No    ROS -   Patient Reports : nausea, vomiting, headaches  Patient Denies: Vaginal Spotting and cramping  Fetal Movement : NA  All other systems reviewed and are negative.     The additional following portions of the patient's history were reviewed and updated as appropriate: allergies and current medications.    I have reviewed and agree with the HPI, ROS, and historical information as entered above. Vijaya Severino, APRN    /90   Wt 45.4 kg (100 lb)   LMP 2022   BMI 18.29 kg/m²         EXAM:     Prenatal Vitals  BP: 130/90  Weight: 45.4 kg (100 lb)   Fetal Heart Rate: +          Urine Glucose Read-only: Negative  Urine Protein Read-only: Negative       Assessment and Plan    Problem List Items Addressed This Visit    None     Visit Diagnoses     11 weeks gestation of pregnancy    -  Primary    Relevant Orders    POC Glucose, Urine, Qualitative, Dipstick (Completed)    POC Protein, Urine, Qualitative, Dipstick (Completed)          1. Pregnancy at 11w3d  2. Labs reviewed from New OB Visit.  3. Counseled on genetic testing, carrier status and option for NT screen  4. Activity and Exercise discussed.  5. Declines cfDNA.  Wants AFP quad screen next visit.  Return in about 4 weeks (around 2022) for pt wants AFP quad screen.    Vijaya Severino, APRN  2022

## 2022-07-18 ENCOUNTER — TELEPHONE (OUTPATIENT)
Dept: OBSTETRICS AND GYNECOLOGY | Facility: CLINIC | Age: 27
End: 2022-07-18

## 2022-07-18 NOTE — TELEPHONE ENCOUNTER
Patient is 13w0d pregnant and was stung by wasp on 7/17. She had spoken to on call nurse regarding taking Benadryl, but was not specific on the type she was using and she believes she may have taken one that was unsafe.    She would like to speak to nurse.    Please advise.

## 2022-08-03 ENCOUNTER — ROUTINE PRENATAL (OUTPATIENT)
Dept: OBSTETRICS AND GYNECOLOGY | Facility: CLINIC | Age: 27
End: 2022-08-03

## 2022-08-03 VITALS — SYSTOLIC BLOOD PRESSURE: 140 MMHG | BODY MASS INDEX: 18.62 KG/M2 | DIASTOLIC BLOOD PRESSURE: 88 MMHG | WEIGHT: 101.8 LBS

## 2022-08-03 DIAGNOSIS — Z3A.15 15 WEEKS GESTATION OF PREGNANCY: Primary | ICD-10-CM

## 2022-08-03 DIAGNOSIS — O10.919 CHRONIC HYPERTENSION AFFECTING PREGNANCY: ICD-10-CM

## 2022-08-03 LAB
GLUCOSE UR STRIP-MCNC: NEGATIVE MG/DL
PROT UR STRIP-MCNC: NEGATIVE MG/DL

## 2022-08-03 PROCEDURE — 99213 OFFICE O/P EST LOW 20 MIN: CPT | Performed by: OBSTETRICS & GYNECOLOGY

## 2022-08-03 NOTE — PROGRESS NOTES
OB FOLLOW UP  CC- Here for care of pregnancy        Susan Dickinson is a 26 y.o.  15w2d patient being seen today for her obstetrical follow up visit. Patient reports no complaints.     Her prenatal care is complicated by (and status) : None  Patient Active Problem List   Diagnosis   • Essential hypertension       Ultrasound Today: No    AFP: declines    ROS -   Patient Reports : No Problems  Patient Denies: Loss of Fluid, Vaginal Spotting, Vision Changes, Headaches, Nausea , Vomiting , Contractions and Epigastric pain  Fetal Movement : absent  All other systems reviewed and are negative.       The additional following portions of the patient's history were reviewed and updated as appropriate: allergies, current medications, past family history, past medical history, past social history, past surgical history and problem list.    I have reviewed and agree with the HPI, ROS, and historical information as entered above. Wendi Miranda MD        EXAM:     Prenatal Vitals  BP: 140/88  Weight: 46.2 kg (101 lb 12.8 oz)       Pelvic Exam:    FHT present    Urine Glucose Read-only: Negative  Urine Protein Read-only: Negative           Assessment and Plan    Problem List Items Addressed This Visit    None     Visit Diagnoses     15 weeks gestation of pregnancy    -  Primary    Relevant Orders    POC Urinalysis Dipstick (Completed)    Chronic hypertension affecting pregnancy              1. Pregnancy at 15w2d  2. Fetal status reassuring.   3. Counseled on MSAFP alone in relation to OTD and placental issues.  Declined  4. Anatomy scan next visit.   5. Activity and Exercise discussed.  6. Reviewed BP - repeat in 2 weeks      Wendi Miranda MD  2022

## 2022-08-16 ENCOUNTER — ROUTINE PRENATAL (OUTPATIENT)
Dept: OBSTETRICS AND GYNECOLOGY | Facility: CLINIC | Age: 27
End: 2022-08-16

## 2022-08-16 VITALS — BODY MASS INDEX: 19.06 KG/M2 | SYSTOLIC BLOOD PRESSURE: 130 MMHG | WEIGHT: 104.2 LBS | DIASTOLIC BLOOD PRESSURE: 80 MMHG

## 2022-08-16 DIAGNOSIS — Z3A.17 17 WEEKS GESTATION OF PREGNANCY: ICD-10-CM

## 2022-08-16 DIAGNOSIS — I10 ESSENTIAL HYPERTENSION: Primary | ICD-10-CM

## 2022-08-16 PROCEDURE — 99213 OFFICE O/P EST LOW 20 MIN: CPT | Performed by: NURSE PRACTITIONER

## 2022-08-16 NOTE — PROGRESS NOTES
OB FOLLOW UP  CC- Here for care of pregnancy        Susan Dickinson is a 26 y.o.  17w1d patient being seen today for her obstetrical follow up visit. Patient reports white/creamy discharge, smaller than a pea size, between thick and thin and only happens 2 times a week. No odor, itching or burning.    Patient reports her blood pressures at home ranging from 118-124 systolic and 75-88 diastolic.     Her prenatal care is complicated by (and status) : HTN  Patient Active Problem List   Diagnosis   • Essential hypertension       Ultrasound Today: No        ROS -   Patient Reports : discharge   Patient Denies: Loss of Fluid, Vaginal Spotting, Vision Changes, Headaches, Nausea , Vomiting , Contractions and Epigastric pain  Fetal Movement : feeling flutters   All other systems reviewed and are negative.       The additional following portions of the patient's history were reviewed and updated as appropriate: allergies and current medications.    I have reviewed and agree with the HPI, ROS, and historical information as entered above. Joe Connor, APRN        EXAM:     Prenatal Vitals  BP: 130/80  Weight: 47.3 kg (104 lb 3.2 oz)   Fetal Heart Rate: 148   Pelvic Exam:                    Assessment and Plan    Problem List Items Addressed This Visit        Cardiac and Vasculature    Essential hypertension - Primary      Other Visit Diagnoses     17 weeks gestation of pregnancy        Relevant Orders    US Ob 14 + Weeks Single or First Gestation          1. Pregnancy at 17w1d  2. Fetal status reassuring.     3. Anatomy scan next visit.   4. Activity and Exercise discussed.  5. Patient is on Prenatal vitamins and baby aspirin daily  6. Call for BP's >140/90  7. RTC in 2 weeks for anatomy US and BP check    JOLENE Cotton  2022

## 2022-08-30 ENCOUNTER — ROUTINE PRENATAL (OUTPATIENT)
Dept: OBSTETRICS AND GYNECOLOGY | Facility: CLINIC | Age: 27
End: 2022-08-30

## 2022-08-30 VITALS — WEIGHT: 104.8 LBS | SYSTOLIC BLOOD PRESSURE: 130 MMHG | BODY MASS INDEX: 19.17 KG/M2 | DIASTOLIC BLOOD PRESSURE: 88 MMHG

## 2022-08-30 DIAGNOSIS — Z3A.19 19 WEEKS GESTATION OF PREGNANCY: Primary | ICD-10-CM

## 2022-08-30 DIAGNOSIS — Z36.2 ENCOUNTER FOR FOLLOW-UP ULTRASOUND OF FETAL ANATOMY: ICD-10-CM

## 2022-08-30 DIAGNOSIS — Z3A.17 17 WEEKS GESTATION OF PREGNANCY: ICD-10-CM

## 2022-08-30 LAB
GLUCOSE UR STRIP-MCNC: NEGATIVE MG/DL
PROT UR STRIP-MCNC: NEGATIVE MG/DL

## 2022-08-30 PROCEDURE — 76805 OB US >/= 14 WKS SNGL FETUS: CPT | Performed by: OBSTETRICS & GYNECOLOGY

## 2022-08-30 PROCEDURE — 99213 OFFICE O/P EST LOW 20 MIN: CPT | Performed by: OBSTETRICS & GYNECOLOGY

## 2022-08-30 NOTE — PROGRESS NOTES
OB FOLLOW UP  CC- Here for care of pregnancy        Susan Dickinson is a 26 y.o.  19w1d patient being seen today for her obstetrical follow up visit. Patient reports that she had some vaginal bleeding after intercourse over the weekend.Patient stated that she called the office and was told pelvic rest for 2 weeks.      Her prenatal care is complicated by (and status) : None  Patient Active Problem List   Diagnosis   • Essential hypertension       Ultrasound Today: Yes    ROS -   Patient Reports : Vaginal Spotting  Patient Denies: Loss of Fluid, Vision Changes, Headaches, Nausea , Vomiting , Contractions and Epigastric pain  Fetal Movement : normal  All other systems reviewed and are negative.       The additional following portions of the patient's history were reviewed and updated as appropriate: allergies, current medications, past family history, past medical history, past social history, past surgical history and problem list.    I have reviewed and agree with the HPI, ROS, and historical information as entered above. Wendi Miranda MD    /88   Wt 47.5 kg (104 lb 12.8 oz)   LMP 2022   BMI 19.17 kg/m²       EXAM:     Prenatal Vitals  BP: 130/88  Weight: 47.5 kg (104 lb 12.8 oz)   Fetal Heart Rate: 146U/S          Urine Glucose Read-only: Negative  Urine Protein Read-only: Negative       Assessment and Plan    Problem List Items Addressed This Visit    None     Visit Diagnoses     19 weeks gestation of pregnancy    -  Primary    Relevant Orders    POC Urinalysis Dipstick (Completed)    17 weeks gestation of pregnancy        Encounter for follow-up ultrasound of fetal anatomy        Relevant Orders    US Ob Follow Up Transabdominal Approach          1. Pregnancy at 19w1d  2. Anatomy scan today is incomplete, follow up in 4 weeks for additional views. Anatomy that was visualized was within normal limits.  3. Fetal status reassuring.   4. Activity and Exercise discussed.  5. Patient is on  Prenatal vitamins  Return in about 4 weeks (around 9/27/2022) for US with Next Visit.    Wendi Miranda MD  08/30/2022

## 2022-09-19 ENCOUNTER — TELEPHONE (OUTPATIENT)
Dept: OBSTETRICS AND GYNECOLOGY | Facility: CLINIC | Age: 27
End: 2022-09-19

## 2022-09-19 DIAGNOSIS — O23.42 URINARY TRACT INFECTION IN MOTHER DURING SECOND TRIMESTER OF PREGNANCY: Primary | ICD-10-CM

## 2022-09-19 RX ORDER — AMOXICILLIN AND CLAVULANATE POTASSIUM 875; 125 MG/1; MG/1
1 TABLET, FILM COATED ORAL EVERY 12 HOURS
Qty: 14 TABLET | Refills: 0 | Status: SHIPPED | OUTPATIENT
Start: 2022-09-19 | End: 2022-09-26

## 2022-09-19 NOTE — TELEPHONE ENCOUNTER
Contacted patient about her recent [facility] visit and questions regarding abx given. She states that she was given Rocephin injection and Ceftin 250mg. Pt has not taken oral abx, wanting clarification. Spoke with Latoya FERNÁNDEZ who agrees to change her Rx to Augmentin 875mg BID x 7 days. Rx sent to [facility] pharmacy. Called to speak with pt who v/u. Also discussed with pt that once Urine culture results are available, abx may be changed if resistant to Augmentin. Informed pt that [facility] should be the one to call and let her know this, however she can call our office with the culture results to clarify a change in abx is not needed. Pt v/u.

## 2022-09-23 ENCOUNTER — TELEPHONE (OUTPATIENT)
Dept: OBSTETRICS AND GYNECOLOGY | Facility: CLINIC | Age: 27
End: 2022-09-23

## 2022-09-23 NOTE — TELEPHONE ENCOUNTER
PT calling to speak with a nurse regarding her UA culture results. She wants to make sure she is on the correct antibiotics.

## 2022-09-23 NOTE — TELEPHONE ENCOUNTER
Pt. Is on Augmentin, informed the bacteria was susceptible to this. Informed we would check a MENDEZ at her next visit (after Tuesdays)

## 2022-09-26 ENCOUNTER — TELEPHONE (OUTPATIENT)
Dept: OBSTETRICS AND GYNECOLOGY | Facility: CLINIC | Age: 27
End: 2022-09-26

## 2022-09-26 NOTE — TELEPHONE ENCOUNTER
Patient has anatomy scan on 9/27 for heart and spine.    Patient would like to know if she can have her left ovary checked as well during this US scan.    She was told she could just request it, but she wants to make sure she can have it done.    Please advise.

## 2022-09-26 NOTE — TELEPHONE ENCOUNTER
If they can see the ovary they always check them but they are more difficult to see the further along you are.

## 2022-09-26 NOTE — TELEPHONE ENCOUNTER
Dr. Miranda OB pt.   23wks    S/w pt she states she has an upcoming anatomy scan scheduled and wanted us to know that she would like for us to check her left ovary since she previously had an ovarian cyst on the left ovary.     I told patient I would let Dr. Miranda know about her request to see if she is okay with that and for her to also remind the U/S tech when she comes in, she v/u

## 2022-09-27 ENCOUNTER — ROUTINE PRENATAL (OUTPATIENT)
Dept: OBSTETRICS AND GYNECOLOGY | Facility: CLINIC | Age: 27
End: 2022-09-27

## 2022-09-27 VITALS — BODY MASS INDEX: 20.74 KG/M2 | SYSTOLIC BLOOD PRESSURE: 128 MMHG | WEIGHT: 113.4 LBS | DIASTOLIC BLOOD PRESSURE: 76 MMHG

## 2022-09-27 DIAGNOSIS — Z3A.23 23 WEEKS GESTATION OF PREGNANCY: Primary | ICD-10-CM

## 2022-09-27 LAB
GLUCOSE UR STRIP-MCNC: NEGATIVE MG/DL
PROT UR STRIP-MCNC: NEGATIVE MG/DL

## 2022-09-27 PROCEDURE — 99212 OFFICE O/P EST SF 10 MIN: CPT

## 2022-09-27 NOTE — PROGRESS NOTES
OB FOLLOW UP  CC- Here for care of pregnancy        Susan Dickinson is a 26 y.o.  23w1d patient being seen today for her obstetrical follow up visit. Patient reports no complaints. Patient states that she went to [facility] 2 weeks ago for UTI s/s. Patient received a Rocephin injection and antibiotics. Patient states that she finished her antibiotics this AM.    Her prenatal care is complicated by (and status) : H/o CHTN. Patient states that her BP at home have been averaging 100-120s/70s.   Patient Active Problem List   Diagnosis   • Essential hypertension       Ultrasound Today: Yes. Findings showed 161bpm, cephalic presentation, movements present, posterior placenta, AF MVP 4.3cm, AC 71%, EFW 51%. Male. Structures appear nml.     ROS -   Patient Reports : No Problems  Patient Denies: Loss of Fluid, Vaginal Spotting, Vision Changes, Headaches, Nausea , Vomiting , Contractions and Epigastric pain  Fetal Movement : normal  All other systems reviewed and are negative.       The additional following portions of the patient's history were reviewed and updated as appropriate: allergies and current medications.    I have reviewed and agree with the HPI, ROS, and historical information as entered above. Latoya Eller, APRN    /76   Wt 51.4 kg (113 lb 6.4 oz)   LMP 2022   BMI 20.74 kg/m²       EXAM:     Prenatal Vitals  BP: 128/76  Weight: 51.4 kg (113 lb 6.4 oz)   Fetal Heart Rate: 161 US               Urine Glucose Read-only: Negative  Urine Protein Read-only: Negative       Assessment and Plan    Problem List Items Addressed This Visit    None     Visit Diagnoses     23 weeks gestation of pregnancy    -  Primary    Relevant Orders    POC Urinalysis Dipstick (Completed)          1. Pregnancy at 23w1d  2. Fetal status reassuring.  3. anatomy scan completed today and within normal limits.  4. 1 hour gtt, CBC, Antibody screen and TDAP next visit. Instructions given  5. Fetal movement/PTL or Labor  precautions  6. Discussed/encouraged TDAP vaccination after 28 weeks  7. Reviewed Pre-eclampsia signs/symptoms  8. Discussed bASA for PIH prevention from 12 to 36wk  9. Activity and Exercise discussed.  Return in about 4 weeks (around 10/25/2022) for RICHARD islas/ KENNA Eller, APRN  09/27/2022

## 2022-10-27 ENCOUNTER — ROUTINE PRENATAL (OUTPATIENT)
Dept: OBSTETRICS AND GYNECOLOGY | Facility: CLINIC | Age: 27
End: 2022-10-27

## 2022-10-27 VITALS — SYSTOLIC BLOOD PRESSURE: 130 MMHG | BODY MASS INDEX: 22.06 KG/M2 | WEIGHT: 120.6 LBS | DIASTOLIC BLOOD PRESSURE: 82 MMHG

## 2022-10-27 DIAGNOSIS — I10 ESSENTIAL HYPERTENSION: Primary | ICD-10-CM

## 2022-10-27 DIAGNOSIS — Z34.02 ENCOUNTER FOR SUPERVISION OF NORMAL FIRST PREGNANCY IN SECOND TRIMESTER: ICD-10-CM

## 2022-10-27 DIAGNOSIS — O23.42 URINARY TRACT INFECTION IN MOTHER DURING SECOND TRIMESTER OF PREGNANCY: ICD-10-CM

## 2022-10-27 LAB
GLUCOSE UR STRIP-MCNC: NEGATIVE MG/DL
PROT UR STRIP-MCNC: NEGATIVE MG/DL

## 2022-10-27 PROCEDURE — 99213 OFFICE O/P EST LOW 20 MIN: CPT | Performed by: OBSTETRICS & GYNECOLOGY

## 2022-10-27 NOTE — PROGRESS NOTES
OB FOLLOW UP  CC- Here for care of pregnancy        Susan Dickinson is a 26 y.o.  27w3d patient being seen today for her obstetrical follow up. Patient reports no complaints.     Patient undergoing Glucola testing today. She is due for her testing at 1030.       MBT: O+  OBGCT: in progress  Rhogam: not indicated  28 week packet: provided, discussed  Consent discussed  Pediatric planning: choices discussed  Smoking cessation discussed never smoked  TDAP: Will give at 28 weeks  Flu Status: Declines  Ultrasound Today: No    Her prenatal care is complicated by (and status) : h/o HTN- patient states that she has not been checking her BP recently. Patient states that she will check her BP when she has a HA. Patient states that when she does check her BP, her BP has been running 120s/80s.   Patient Active Problem List   Diagnosis   • Essential hypertension         ROS -   Patient Reports : No Problems  Patient Denies: Loss of Fluid, Vaginal Spotting, Vision Changes, Headaches, Nausea , Vomiting , Contractions and Epigastric pain  Fetal Movement : normal    The additional following portions of the patient's history were reviewed and updated as appropriate: allergies and current medications.    I have reviewed and agree with the HPI, ROS, and historical information as entered above. Wendi Miranda MD    /82   Wt 54.7 kg (120 lb 9.6 oz)   LMP 2022   BMI 22.06 kg/m²         EXAM:     Prenatal Vitals  BP: 130/82  Weight: 54.7 kg (120 lb 9.6 oz)   Fetal Heart Rate: present               Urine Glucose Read-only: Negative  Urine Protein Read-only: Negative       Assessment and Plan    Problem List Items Addressed This Visit        Cardiac and Vasculature    Essential hypertension - Primary    Overview     Taking Asa 81.     No BP medications        Other Visit Diagnoses     Encounter for supervision of normal first pregnancy in second trimester        Relevant Orders    Antibody Screen    CBC (No  Diff)    Gestational Screen 1 Hr ([facility])    POC Urinalysis Dipstick (Completed)    Urine Culture - Urine, Urine, Clean Catch    Urinary tract infection in mother during second trimester of pregnancy        Relevant Orders    Urine Culture - Urine, Urine, Clean Catch          1. Pregnancy at 27w3d  2. 1 hr Glucola, CBC, and antibody screen today  and TDAP given today  3. Fetal movement/PTL or Labor precautions   4. U/S at 33 weeks  5. Activity and Exercise discussed.  Return in about 4 weeks (around 11/24/2022).    Wendi Miranda MD  10/27/2022

## 2022-10-28 ENCOUNTER — TELEPHONE (OUTPATIENT)
Dept: OBSTETRICS AND GYNECOLOGY | Facility: CLINIC | Age: 27
End: 2022-10-28

## 2022-10-28 DIAGNOSIS — O99.012 MATERNAL IRON DEFICIENCY ANEMIA AFFECTING PREGNANCY IN SECOND TRIMESTER, ANTEPARTUM: Primary | ICD-10-CM

## 2022-10-28 DIAGNOSIS — D50.9 MATERNAL IRON DEFICIENCY ANEMIA AFFECTING PREGNANCY IN SECOND TRIMESTER, ANTEPARTUM: Primary | ICD-10-CM

## 2022-10-28 LAB
BLD GP AB SCN SERPL QL: NEGATIVE
ERYTHROCYTE [DISTWIDTH] IN BLOOD BY AUTOMATED COUNT: 14.1 % (ref 12.3–15.4)
GLUCOSE 1H P 50 G GLC PO SERPL-MCNC: 93 MG/DL (ref 65–139)
HCT VFR BLD AUTO: 26.5 % (ref 34–46.6)
HGB BLD-MCNC: 8.5 G/DL (ref 12–15.9)
MCH RBC QN AUTO: 25.8 PG (ref 26.6–33)
MCHC RBC AUTO-ENTMCNC: 32.1 G/DL (ref 31.5–35.7)
MCV RBC AUTO: 80.5 FL (ref 79–97)
PLATELET # BLD AUTO: 216 10*3/MM3 (ref 140–450)
RBC # BLD AUTO: 3.29 10*6/MM3 (ref 3.77–5.28)
WBC # BLD AUTO: 7.33 10*3/MM3 (ref 3.4–10.8)

## 2022-10-29 LAB
BACTERIA UR CULT: NORMAL
BACTERIA UR CULT: NORMAL

## 2022-11-02 ENCOUNTER — PATIENT MESSAGE (OUTPATIENT)
Dept: OBSTETRICS AND GYNECOLOGY | Facility: CLINIC | Age: 27
End: 2022-11-02

## 2022-11-02 DIAGNOSIS — O99.019 MATERNAL ANEMIA IN PREGNANCY, ANTEPARTUM: Primary | ICD-10-CM

## 2022-11-03 NOTE — TELEPHONE ENCOUNTER
Iron profile and Ferritin order placed. Instructed patient to come into the office tomorrow to have this lab work done. Patient will be in the office tomorrow.

## 2022-11-04 ENCOUNTER — LAB (OUTPATIENT)
Dept: OBSTETRICS AND GYNECOLOGY | Facility: CLINIC | Age: 27
End: 2022-11-04

## 2022-11-05 LAB
FERRITIN SERPL-MCNC: 19 NG/ML (ref 13–150)
IRON SATN MFR SERPL: 32 % (ref 20–50)
IRON SERPL-MCNC: 188 MCG/DL (ref 37–145)
TIBC SERPL-MCNC: 580 MCG/DL
UIBC SERPL-MCNC: 392 MCG/DL (ref 112–346)

## 2022-11-07 ENCOUNTER — TELEPHONE (OUTPATIENT)
Dept: OBSTETRICS AND GYNECOLOGY | Facility: CLINIC | Age: 27
End: 2022-11-07

## 2022-11-07 NOTE — TELEPHONE ENCOUNTER
Caller: Susan Dickinson    Relationship: Self     Best call back number: [phone]    Caller requesting test results: SUSAN DICKINSON    What test was performed: IRON TEST    When was the test performed: 11/4/22    Additional notes: PT WOULD LIKE TO DISCUSS IRON TEST RESULTS FROM 11/4/22 PRIOR TO IRON INJECTION SCHEDULED 11/10/22

## 2022-11-09 NOTE — TELEPHONE ENCOUNTER
LOS pt   29w2d    SW pt about iron studies and transfusion status. See previous encounters. Transfusion called pt today 1 hour ago to cancel transfusion because insurance would not approve. Pt reports infusion center telling her our office would have to put in orders with different diagnostic codes to approve because iron studies performed on 11/04/22 resulted normal to elevated UIBC 392 and Iron 188 with saturation 32% . However CBC on 10/27/22 resulted hemoglobin 8.5 and hematocrit 26.5 pt started on iron T.I.D. and still taking.Will consult on call, CBF for plan of care and call pt back. Pt KIERRA

## 2022-11-09 NOTE — TELEPHONE ENCOUNTER
SW pt about plan of care. Per CBF, continue iron TID. Will attempt to contact infusion center for diagnostic codes to get transfusion approved. Iron supplements can take up to 1 month to improve CBC. Will speak with LOS tomorrow for how to proceed with case. Pt VU

## 2022-11-10 ENCOUNTER — HOSPITAL ENCOUNTER (OUTPATIENT)
Dept: ONCOLOGY | Facility: HOSPITAL | Age: 27
Setting detail: INFUSION SERIES
End: 2022-11-10

## 2022-11-10 ENCOUNTER — TELEPHONE (OUTPATIENT)
Dept: OBSTETRICS AND GYNECOLOGY | Facility: CLINIC | Age: 27
End: 2022-11-10

## 2022-11-17 ENCOUNTER — APPOINTMENT (OUTPATIENT)
Dept: ONCOLOGY | Facility: HOSPITAL | Age: 27
End: 2022-11-17

## 2022-12-01 ENCOUNTER — ROUTINE PRENATAL (OUTPATIENT)
Dept: OBSTETRICS AND GYNECOLOGY | Facility: CLINIC | Age: 27
End: 2022-12-01

## 2022-12-01 VITALS — DIASTOLIC BLOOD PRESSURE: 82 MMHG | WEIGHT: 125 LBS | BODY MASS INDEX: 22.86 KG/M2 | SYSTOLIC BLOOD PRESSURE: 132 MMHG

## 2022-12-01 DIAGNOSIS — D50.9 MATERNAL IRON DEFICIENCY ANEMIA AFFECTING PREGNANCY IN SECOND TRIMESTER, ANTEPARTUM: ICD-10-CM

## 2022-12-01 DIAGNOSIS — I10 ESSENTIAL HYPERTENSION: ICD-10-CM

## 2022-12-01 DIAGNOSIS — Z34.03 ENCOUNTER FOR SUPERVISION OF NORMAL FIRST PREGNANCY IN THIRD TRIMESTER: Primary | ICD-10-CM

## 2022-12-01 DIAGNOSIS — O99.012 MATERNAL IRON DEFICIENCY ANEMIA AFFECTING PREGNANCY IN SECOND TRIMESTER, ANTEPARTUM: ICD-10-CM

## 2022-12-01 LAB
GLUCOSE UR STRIP-MCNC: NEGATIVE MG/DL
PROT UR STRIP-MCNC: NEGATIVE MG/DL

## 2022-12-01 PROCEDURE — 90715 TDAP VACCINE 7 YRS/> IM: CPT | Performed by: OBSTETRICS & GYNECOLOGY

## 2022-12-01 PROCEDURE — 99213 OFFICE O/P EST LOW 20 MIN: CPT | Performed by: OBSTETRICS & GYNECOLOGY

## 2022-12-01 PROCEDURE — 90471 IMMUNIZATION ADMIN: CPT | Performed by: OBSTETRICS & GYNECOLOGY

## 2022-12-01 RX ORDER — UREA 10 %
LOTION (ML) TOPICAL
COMMUNITY
End: 2023-02-21

## 2022-12-12 ENCOUNTER — ROUTINE PRENATAL (OUTPATIENT)
Dept: OBSTETRICS AND GYNECOLOGY | Facility: CLINIC | Age: 27
End: 2022-12-12

## 2022-12-12 VITALS — WEIGHT: 129.8 LBS | BODY MASS INDEX: 23.74 KG/M2 | DIASTOLIC BLOOD PRESSURE: 68 MMHG | SYSTOLIC BLOOD PRESSURE: 112 MMHG

## 2022-12-12 DIAGNOSIS — Z34.03 ENCOUNTER FOR SUPERVISION OF NORMAL FIRST PREGNANCY IN THIRD TRIMESTER: ICD-10-CM

## 2022-12-12 DIAGNOSIS — O99.012 MATERNAL IRON DEFICIENCY ANEMIA AFFECTING PREGNANCY IN SECOND TRIMESTER, ANTEPARTUM: ICD-10-CM

## 2022-12-12 DIAGNOSIS — I10 ESSENTIAL HYPERTENSION: Primary | ICD-10-CM

## 2022-12-12 DIAGNOSIS — D50.9 MATERNAL IRON DEFICIENCY ANEMIA AFFECTING PREGNANCY IN SECOND TRIMESTER, ANTEPARTUM: ICD-10-CM

## 2022-12-12 LAB
EXPIRATION DATE: 0
GLUCOSE UR STRIP-MCNC: NEGATIVE MG/DL
Lab: 0
PROT UR STRIP-MCNC: NEGATIVE MG/DL

## 2022-12-12 PROCEDURE — 99213 OFFICE O/P EST LOW 20 MIN: CPT | Performed by: NURSE PRACTITIONER

## 2022-12-12 NOTE — PROGRESS NOTES
OB FOLLOW UP  CC- Here for care of pregnancy        Susan Dickinson is a 27 y.o.  34w0d patient being seen today for her obstetrical follow up visit. Patient reports visual changes that is not accompanied with a headache.    Her prenatal care is complicated by (and status) : Essential Hypertension  Patient reports at home BP's have averaging 118/78.  Patient Active Problem List   Diagnosis   • Essential hypertension   • Maternal iron deficiency anemia affecting pregnancy in second trimester, antepartum   • Encounter for supervision of normal first pregnancy in third trimester       Flu Status: Declines  Ultrasound Today: No  Non Stress Test: No.      ROS -   Patient Reports : Vision Changes  Patient Denies: Loss of Fluid, Vaginal Spotting, Headaches, Nausea , Vomiting , Contractions and Epigastric pain  Fetal Movement : normal  All other systems reviewed and are negative.       The additional following portions of the patient's history were reviewed and updated as appropriate: allergies and current medications.    I have reviewed and agree with the HPI, ROS, and historical information as entered above. Joe Connor, APRN    /68   Wt 58.9 kg (129 lb 12.8 oz)   LMP 2022   BMI 23.74 kg/m²       EXAM:     Prenatal Vitals  BP: 112/68  Weight: 58.9 kg (129 lb 12.8 oz)   Fetal Heart Rate: 155      Fundal Height (cm): 32 cm        Urine Glucose Read-only: Negative  Urine Protein Read-only: Negative       Assessment and Plan    Problem List Items Addressed This Visit        Cardiac and Vasculature    Essential hypertension - Primary    Overview     Taking Asa 81.     No BP medications            Gravid and     Encounter for supervision of normal first pregnancy in third trimester       Hematology and Neoplasia    Maternal iron deficiency anemia affecting pregnancy in second trimester, antepartum    Relevant Medications    ferrous sulfate 140 (45 Fe) MG tablet controlled-release  tablet       1. Pregnancy at 34w0d. Continue to monitor BP and call for 140/90, HA not improved with medicine, rest, and hydration.  2. Fetal status reassuring.   3. Activity and Exercise discussed.  4. Fetal movement/PTL or Labor precautions  Return in about 2 weeks (around 12/26/2022) for Next scheduled follow up.    Joe Connor, APRN  12/12/2022

## 2022-12-30 ENCOUNTER — ROUTINE PRENATAL (OUTPATIENT)
Dept: OBSTETRICS AND GYNECOLOGY | Facility: CLINIC | Age: 27
End: 2022-12-30

## 2022-12-30 ENCOUNTER — LAB (OUTPATIENT)
Dept: LAB | Facility: HOSPITAL | Age: 27
End: 2022-12-30
Payer: COMMERCIAL

## 2022-12-30 VITALS — SYSTOLIC BLOOD PRESSURE: 128 MMHG | DIASTOLIC BLOOD PRESSURE: 82 MMHG | WEIGHT: 137 LBS | BODY MASS INDEX: 25.06 KG/M2

## 2022-12-30 DIAGNOSIS — O99.012 MATERNAL IRON DEFICIENCY ANEMIA AFFECTING PREGNANCY IN SECOND TRIMESTER, ANTEPARTUM: ICD-10-CM

## 2022-12-30 DIAGNOSIS — D50.9 MATERNAL IRON DEFICIENCY ANEMIA AFFECTING PREGNANCY IN SECOND TRIMESTER, ANTEPARTUM: ICD-10-CM

## 2022-12-30 DIAGNOSIS — I10 ESSENTIAL HYPERTENSION: Primary | ICD-10-CM

## 2022-12-30 DIAGNOSIS — Z34.02 ENCOUNTER FOR SUPERVISION OF NORMAL FIRST PREGNANCY IN SECOND TRIMESTER: ICD-10-CM

## 2022-12-30 LAB
EXPIRATION DATE: 0
GLUCOSE UR STRIP-MCNC: NEGATIVE MG/DL
Lab: 0
PROT UR STRIP-MCNC: NEGATIVE MG/DL

## 2022-12-30 PROCEDURE — 87081 CULTURE SCREEN ONLY: CPT

## 2022-12-30 PROCEDURE — 99213 OFFICE O/P EST LOW 20 MIN: CPT | Performed by: NURSE PRACTITIONER

## 2022-12-30 NOTE — PROGRESS NOTES
OB FOLLOW UP  CC- Here for care of pregnancy        Susan Dickinson is a 27 y.o.  36w4d patient being seen today for her obstetrical follow up visit. Patient reports clear and sometimes cloudy discharge since first trimester.     Her prenatal care is complicated by (and status) :   Patient Active Problem List   Diagnosis   • Essential hypertension   • Maternal iron deficiency anemia affecting pregnancy in second trimester, antepartum   • Encounter for supervision of normal first pregnancy in third trimester       GBS Status: Done Today. She is not allergic to PCN.    No Known Allergies       Her Delivery Plan is:  No IOL  US today: no  Non Stress Test: No.      ROS -   Patient Denies: Loss of Fluid, Vaginal Spotting, Vision Changes, Headaches, Nausea , Vomiting , Contractions and Epigastric pain  Fetal Movement : normal  All other systems reviewed and are negative.       The additional following portions of the patient's history were reviewed and updated as appropriate: allergies and current medications.    I have reviewed and agree with the HPI, ROS, and historical information as entered above. Vijaya Severino, APRN      EXAM:     Prenatal Vitals  BP: 128/82  Weight: 62.1 kg (137 lb)   Fetal Heart Rate: +   Fundal Height (cm): 36 cm   Dilation/Effacement/Station  Dilation: Closed      Urine Glucose Read-only: Negative  Urine Protein Read-only: Negative       Assessment and Plan    Problem List Items Addressed This Visit        Cardiac and Vasculature    Essential hypertension - Primary    Overview     Taking Asa 81.     No BP medications            Hematology and Neoplasia    Maternal iron deficiency anemia affecting pregnancy in second trimester, antepartum    Relevant Medications    ferrous sulfate 140 (45 Fe) MG tablet controlled-release tablet   Other Visit Diagnoses     Encounter for supervision of normal first pregnancy in second trimester        Relevant Orders    Strep B Screen - Swab,  Vaginal/Rectum    POC Glucose, Urine, Qualitative, Dipstick (Completed)    POC Protein, Urine, Qualitative, Dipstick (Completed)          1. Pregnancy at 36w4d  2. Fetal status reassuring.   3. Reviewed Pre-eclampsia signs/symptoms  4. Delivery options reviewed with patient  5. Signs of labor reviewed  6. Kick counts reviewed  7. Activity and Exercise discussed.  Return in about 1 week (around 1/6/2023).    Vijaya Severino, APRN  12/30/2022

## 2023-01-02 LAB — BACTERIA SPEC AEROBE CULT: NORMAL

## 2023-01-03 ENCOUNTER — ROUTINE PRENATAL (OUTPATIENT)
Dept: OBSTETRICS AND GYNECOLOGY | Facility: CLINIC | Age: 28
End: 2023-01-03
Payer: MEDICAID

## 2023-01-03 VITALS — DIASTOLIC BLOOD PRESSURE: 72 MMHG | WEIGHT: 137 LBS | BODY MASS INDEX: 25.06 KG/M2 | SYSTOLIC BLOOD PRESSURE: 120 MMHG

## 2023-01-03 DIAGNOSIS — Z34.03 FIRST PREGNANCY, THIRD TRIMESTER: Primary | ICD-10-CM

## 2023-01-03 DIAGNOSIS — O16.3 ELEVATED BLOOD PRESSURE AFFECTING PREGNANCY IN THIRD TRIMESTER, ANTEPARTUM: ICD-10-CM

## 2023-01-03 LAB
GLUCOSE UR STRIP-MCNC: NEGATIVE MG/DL
PROT UR STRIP-MCNC: NEGATIVE MG/DL

## 2023-01-03 PROCEDURE — 99213 OFFICE O/P EST LOW 20 MIN: CPT | Performed by: OBSTETRICS & GYNECOLOGY

## 2023-01-03 PROCEDURE — 59025 FETAL NON-STRESS TEST: CPT | Performed by: OBSTETRICS & GYNECOLOGY

## 2023-01-03 NOTE — PROGRESS NOTES
OB FOLLOW UP  CC- Here for care of pregnancy        Susan Dickinson is a 27 y.o.  37w1d patient being seen today for her obstetrical follow up visit. Patient reports swelling in both lower extremities (it is Pitting), vaginal pressure/pain, and low back pain.     Her prenatal care is complicated by (and status) : HTN. Patient states that her BP at home have been averaging 120s/80s. Patient states that she did have an elevated BP reading on 2022 that was 189/86. Patient states that she waited 45 minutes and her recheck was 115/84. Patient denies elevated BP since then.  Patient Active Problem List   Diagnosis   • Essential hypertension   • Maternal iron deficiency anemia affecting pregnancy in second trimester, antepartum   • Encounter for supervision of normal first pregnancy in third trimester       GBS Status:   Group B Strep Culture   Date Value Ref Range Status   2022 No Group B Streptococcus isolated  Final         No Known Allergies       Flu Status: Declines  Her Delivery Plan is: Does not desire IOL    US today: No  Reason for test:  HTN  Date of Test: 1/3/2023  Time frame of test: 20 minutes  NST Interpretation:          ROS -   Patient Reports : see above  Patient Denies: Loss of Fluid, Vaginal Spotting, Vision Changes, Headaches, Nausea , Vomiting , Contractions and Epigastric pain  Fetal Movement : normal  All other systems reviewed and are negative.       The additional following portions of the patient's history were reviewed and updated as appropriate: allergies and current medications.    I have reviewed and agree with the HPI, ROS, and historical information as entered above. Wendi Miranda MD      EXAM:     Prenatal Vitals  BP: 120/72  Weight: 62.1 kg (137 lb)   Fetal Heart Rate: pos          NST NOTE    Indiction :   Elevated BP reading  FHR:          Reactive, Cat 1, No decels  Contractions:    Irregular  Time Monitored:   > 20 minutes      Urine Glucose Read-only:  Negative  Urine Protein Read-only: Negative       Assessment and Plan    Problem List Items Addressed This Visit    None  Visit Diagnoses     First pregnancy, third trimester    -  Primary    Relevant Orders    POC Urinalysis Dipstick (Completed)    Elevated blood pressure affecting pregnancy in third trimester, antepartum        Relevant Orders    AlP+ALT+AST+Creat+LD+TBili+..          1. Pregnancy at 37w1d  2. Fetal status reassuring.   3. Reviewed Pre-eclampsia signs/symptoms.  Go to L&D for any pressures > 140/90.  Labs today.  F/U on Friday for repeat BP in office  4. Delivery options reviewed with patient  5. Signs of labor reviewed  6. Kick counts reviewed  7. Activity and Exercise discussed.  Return in about 3 days (around 1/6/2023).    Wendi Miranda MD  01/03/2023

## 2023-01-05 LAB
ALP SERPL-CCNC: 206 U/L (ref 39–117)
ALT SERPL-CCNC: 9 U/L (ref 1–33)
AST SERPL-CCNC: 15 U/L (ref 1–32)
BASOPHILS # BLD MANUAL: 0.07 10*3/MM3 (ref 0–0.2)
BASOPHILS NFR BLD MANUAL: 1 % (ref 0–1.5)
BILIRUB SERPL-MCNC: <0.2 MG/DL (ref 0–1.2)
CREAT SERPL-MCNC: 0.55 MG/DL (ref 0.57–1)
DIFFERENTIAL COMMENT: ABNORMAL
EGFRCR SERPLBLD CKD-EPI 2021: 130.6 ML/MIN/1.73
EOSINOPHIL # BLD MANUAL: 0.07 10*3/MM3 (ref 0–0.4)
EOSINOPHIL NFR BLD MANUAL: 1 % (ref 0.3–6.2)
ERYTHROCYTE [DISTWIDTH] IN BLOOD BY AUTOMATED COUNT: 16.6 % (ref 12.3–15.4)
HCT VFR BLD AUTO: 28 % (ref 34–46.6)
HGB BLD-MCNC: 8.5 G/DL (ref 12–15.9)
LDH SERPL L TO P-CCNC: 198 U/L (ref 135–214)
LYMPHOCYTES # BLD MANUAL: 1.86 10*3/MM3 (ref 0.7–3.1)
LYMPHOCYTES NFR BLD MANUAL: 28.1 % (ref 19.6–45.3)
MCH RBC QN AUTO: 23 PG (ref 26.6–33)
MCHC RBC AUTO-ENTMCNC: 30.4 G/DL (ref 31.5–35.7)
MCV RBC AUTO: 75.9 FL (ref 79–97)
MONOCYTES # BLD MANUAL: 0.28 10*3/MM3 (ref 0.1–0.9)
MONOCYTES NFR BLD MANUAL: 4.2 % (ref 5–12)
NEUTROPHILS # BLD MANUAL: 4.35 10*3/MM3 (ref 1.7–7)
NEUTROPHILS NFR BLD MANUAL: 65.6 % (ref 42.7–76)
NRBC BLD AUTO-RTO: 0 /100 WBC (ref 0–0.2)
PLATELET # BLD AUTO: 127 10*3/MM3 (ref 140–450)
PLATELET BLD QL SMEAR: ABNORMAL
RBC # BLD AUTO: 3.69 10*6/MM3 (ref 3.77–5.28)
RBC MORPH BLD: ABNORMAL
URATE SERPL-MCNC: 4.7 MG/DL (ref 2.4–5.7)
WBC # BLD AUTO: 6.63 10*3/MM3 (ref 3.4–10.8)

## 2023-01-06 ENCOUNTER — ROUTINE PRENATAL (OUTPATIENT)
Dept: OBSTETRICS AND GYNECOLOGY | Facility: CLINIC | Age: 28
End: 2023-01-06
Payer: MEDICAID

## 2023-01-06 VITALS — WEIGHT: 137.4 LBS | DIASTOLIC BLOOD PRESSURE: 88 MMHG | BODY MASS INDEX: 25.13 KG/M2 | SYSTOLIC BLOOD PRESSURE: 128 MMHG

## 2023-01-06 DIAGNOSIS — Z34.03 FIRST PREGNANCY, THIRD TRIMESTER: Primary | ICD-10-CM

## 2023-01-06 LAB
GLUCOSE UR STRIP-MCNC: NEGATIVE MG/DL
PROT UR STRIP-MCNC: NEGATIVE MG/DL

## 2023-01-06 PROCEDURE — 0502F SUBSEQUENT PRENATAL CARE: CPT | Performed by: NURSE PRACTITIONER

## 2023-01-06 NOTE — PROGRESS NOTES
OB FOLLOW UP  CC- Here for care of pregnancy        Susan Dickinson is a 27 y.o.  37w4d patient being seen today for her obstetrical follow up visit. Patient reports no complaints.      Her prenatal care is complicated by (and status) :   Patient Active Problem List   Diagnosis   • Essential hypertension   • Maternal iron deficiency anemia affecting pregnancy in second trimester, antepartum   • Encounter for supervision of normal first pregnancy in third trimester       GBS Status: Negative  Group B Strep Culture   Date Value Ref Range Status   2022 No Group B Streptococcus isolated  Final         No Known Allergies       Flu Status: Declines  Her Delivery Plan is: Undecided    US today: no  Non Stress Test: No.       ROS -   Patient Reports : No Problems  Patient Denies: Loss of Fluid, Vaginal Spotting, Vision Changes, Headaches, Nausea , Vomiting , Contractions and Epigastric pain  Fetal Movement : normal  All other systems reviewed and are negative.       The additional following portions of the patient's history were reviewed and updated as appropriate: allergies, current medications, past family history, past medical history, past social history, past surgical history and problem list.    I have reviewed and agree with the HPI, ROS, and historical information as entered above. Joe Connor, APRN      EXAM:     Prenatal Vitals  BP: 128/88  Weight: 62.3 kg (137 lb 6.4 oz)   Fetal Heart Rate: 138              Urine Glucose Read-only: Negative  Urine Protein Read-only: Negative       Assessment and Plan    Problem List Items Addressed This Visit    None  Visit Diagnoses     First pregnancy, third trimester    -  Primary    Relevant Orders    POC Urinalysis Dipstick (Completed)          1. Pregnancy at 37w4d.   2. Susan's BP's have been well controlled since last visit. She has not had any severe range pressures. Labs from last visit not concerning for preeclampsia. Denies preeclampsia  s/s.  3. Fetal status reassuring.   4. Reviewed Pre-eclampsia signs/symptoms  5. Signs of labor reviewed  6. Kick counts reviewed  7. Activity and Exercise discussed. Encouraged light activity.   8. Go to  over the weekend for BP >140/90 or s/s preeclampsia.  Return in about 3 days (around 1/9/2023) for NST.    Joe Connor, APRN  01/06/2023

## 2023-01-09 ENCOUNTER — ROUTINE PRENATAL (OUTPATIENT)
Dept: OBSTETRICS AND GYNECOLOGY | Facility: CLINIC | Age: 28
End: 2023-01-09
Payer: MEDICAID

## 2023-01-09 ENCOUNTER — HOSPITAL ENCOUNTER (INPATIENT)
Facility: HOSPITAL | Age: 28
LOS: 3 days | Discharge: HOME | End: 2023-01-12
Attending: OBSTETRICS & GYNECOLOGY | Admitting: OBSTETRICS & GYNECOLOGY
Payer: COMMERCIAL

## 2023-01-09 VITALS — WEIGHT: 136 LBS | BODY MASS INDEX: 24.87 KG/M2 | SYSTOLIC BLOOD PRESSURE: 148 MMHG | DIASTOLIC BLOOD PRESSURE: 98 MMHG

## 2023-01-09 DIAGNOSIS — I10 ESSENTIAL HYPERTENSION: Primary | ICD-10-CM

## 2023-01-09 DIAGNOSIS — Z34.03 ENCOUNTER FOR SUPERVISION OF NORMAL FIRST PREGNANCY IN THIRD TRIMESTER: ICD-10-CM

## 2023-01-09 DIAGNOSIS — D50.9 MATERNAL IRON DEFICIENCY ANEMIA AFFECTING PREGNANCY IN SECOND TRIMESTER, ANTEPARTUM: ICD-10-CM

## 2023-01-09 DIAGNOSIS — O99.012 MATERNAL IRON DEFICIENCY ANEMIA AFFECTING PREGNANCY IN SECOND TRIMESTER, ANTEPARTUM: ICD-10-CM

## 2023-01-09 PROBLEM — O13.9 GESTATIONAL HTN: Status: ACTIVE | Noted: 2023-01-09

## 2023-01-09 LAB
ABO GROUP BLD: NORMAL
ABO GROUP BLD: NORMAL
ALP SERPL-CCNC: 229 U/L (ref 39–117)
ALT SERPL W P-5'-P-CCNC: 9 U/L (ref 1–33)
AST SERPL-CCNC: 19 U/L (ref 1–32)
BILIRUB SERPL-MCNC: 0.3 MG/DL (ref 0–1.2)
BLD GP AB SCN SERPL QL: NEGATIVE
CREAT SERPL-MCNC: 0.55 MG/DL (ref 0.57–1)
DEPRECATED RDW RBC AUTO: 46.5 FL (ref 37–54)
ERYTHROCYTE [DISTWIDTH] IN BLOOD BY AUTOMATED COUNT: 17.2 % (ref 12.3–15.4)
EXPIRATION DATE: NORMAL
GLUCOSE UR STRIP-MCNC: NEGATIVE MG/DL
HCT VFR BLD AUTO: 30.2 % (ref 34–46.6)
HGB BLD-MCNC: 9.2 G/DL (ref 12–15.9)
LDH SERPL-CCNC: 229 U/L (ref 135–214)
Lab: NORMAL
MCH RBC QN AUTO: 23 PG (ref 26.6–33)
MCHC RBC AUTO-ENTMCNC: 30.5 G/DL (ref 31.5–35.7)
MCV RBC AUTO: 75.5 FL (ref 79–97)
PLATELET # BLD AUTO: 132 10*3/MM3 (ref 140–450)
PROT UR STRIP-MCNC: NEGATIVE MG/DL
RBC # BLD AUTO: 4 10*6/MM3 (ref 3.77–5.28)
RH BLD: POSITIVE
RH BLD: POSITIVE
T&S EXPIRATION DATE: NORMAL
URATE SERPL-MCNC: 4.4 MG/DL (ref 2.4–5.7)
WBC NRBC COR # BLD: 7.39 10*3/MM3 (ref 3.4–10.8)

## 2023-01-09 PROCEDURE — 86900 BLOOD TYPING SEROLOGIC ABO: CPT | Performed by: OBSTETRICS & GYNECOLOGY

## 2023-01-09 PROCEDURE — 99214 OFFICE O/P EST MOD 30 MIN: CPT | Performed by: NURSE PRACTITIONER

## 2023-01-09 PROCEDURE — 59200 INSERT CERVICAL DILATOR: CPT | Performed by: OBSTETRICS & GYNECOLOGY

## 2023-01-09 PROCEDURE — 85027 COMPLETE CBC AUTOMATED: CPT | Performed by: OBSTETRICS & GYNECOLOGY

## 2023-01-09 PROCEDURE — 86901 BLOOD TYPING SEROLOGIC RH(D): CPT

## 2023-01-09 PROCEDURE — 82565 ASSAY OF CREATININE: CPT | Performed by: OBSTETRICS & GYNECOLOGY

## 2023-01-09 PROCEDURE — 84075 ASSAY ALKALINE PHOSPHATASE: CPT | Performed by: OBSTETRICS & GYNECOLOGY

## 2023-01-09 PROCEDURE — 83615 LACTATE (LD) (LDH) ENZYME: CPT | Performed by: OBSTETRICS & GYNECOLOGY

## 2023-01-09 PROCEDURE — 36415 COLL VENOUS BLD VENIPUNCTURE: CPT | Performed by: OBSTETRICS & GYNECOLOGY

## 2023-01-09 PROCEDURE — 59025 FETAL NON-STRESS TEST: CPT | Performed by: NURSE PRACTITIONER

## 2023-01-09 PROCEDURE — 86900 BLOOD TYPING SEROLOGIC ABO: CPT

## 2023-01-09 PROCEDURE — 86850 RBC ANTIBODY SCREEN: CPT | Performed by: OBSTETRICS & GYNECOLOGY

## 2023-01-09 PROCEDURE — 82247 BILIRUBIN TOTAL: CPT | Performed by: OBSTETRICS & GYNECOLOGY

## 2023-01-09 PROCEDURE — 84460 ALANINE AMINO (ALT) (SGPT): CPT | Performed by: OBSTETRICS & GYNECOLOGY

## 2023-01-09 PROCEDURE — 84550 ASSAY OF BLOOD/URIC ACID: CPT | Performed by: OBSTETRICS & GYNECOLOGY

## 2023-01-09 PROCEDURE — 84450 TRANSFERASE (AST) (SGOT): CPT | Performed by: OBSTETRICS & GYNECOLOGY

## 2023-01-09 PROCEDURE — 86901 BLOOD TYPING SEROLOGIC RH(D): CPT | Performed by: OBSTETRICS & GYNECOLOGY

## 2023-01-09 RX ORDER — MAGNESIUM CARB/ALUMINUM HYDROX 105-160MG
30 TABLET,CHEWABLE ORAL ONCE
Status: DISCONTINUED | OUTPATIENT
Start: 2023-01-09 | End: 2023-01-10

## 2023-01-09 RX ORDER — ONDANSETRON 2 MG/ML
4 INJECTION INTRAMUSCULAR; INTRAVENOUS EVERY 6 HOURS PRN
Status: DISCONTINUED | OUTPATIENT
Start: 2023-01-09 | End: 2023-01-10

## 2023-01-09 RX ORDER — SODIUM CHLORIDE, SODIUM LACTATE, POTASSIUM CHLORIDE, CALCIUM CHLORIDE 600; 310; 30; 20 MG/100ML; MG/100ML; MG/100ML; MG/100ML
125 INJECTION, SOLUTION INTRAVENOUS CONTINUOUS
Status: DISCONTINUED | OUTPATIENT
Start: 2023-01-09 | End: 2023-01-12 | Stop reason: HOSPADM

## 2023-01-09 RX ORDER — OXYTOCIN/0.9 % SODIUM CHLORIDE 30/500 ML
2-20 PLASTIC BAG, INJECTION (ML) INTRAVENOUS
Status: DISCONTINUED | OUTPATIENT
Start: 2023-01-09 | End: 2023-01-09

## 2023-01-09 RX ORDER — ONDANSETRON 4 MG/1
4 TABLET, FILM COATED ORAL EVERY 6 HOURS PRN
Status: DISCONTINUED | OUTPATIENT
Start: 2023-01-09 | End: 2023-01-10

## 2023-01-09 RX ORDER — OXYTOCIN/0.9 % SODIUM CHLORIDE 30/500 ML
2-20 PLASTIC BAG, INJECTION (ML) INTRAVENOUS
Status: DISCONTINUED | OUTPATIENT
Start: 2023-01-10 | End: 2023-01-10

## 2023-01-09 RX ADMIN — SODIUM CHLORIDE, POTASSIUM CHLORIDE, SODIUM LACTATE AND CALCIUM CHLORIDE 125 ML/HR: 600; 310; 30; 20 INJECTION, SOLUTION INTRAVENOUS at 18:18

## 2023-01-09 NOTE — PROGRESS NOTES
OB FOLLOW UP  CC- Here for care of pregnancy        Susan Dickinson is a 27 y.o.  38w0d patient being seen today for her obstetrical follow up visit. Patient reports cramping.    She denies LOF, vaginal spotting, headaches, vision changes, swelling. She reports adequate fetal movements, >10 movements in 10 hours.      She reports that she has been monitoring BP at home, and it averages 123-128/80s.     Her prenatal care is complicated by (and status) : Chronic HTN. Her average BP has been 120's/80's.  Patient Active Problem List   Diagnosis   • Essential hypertension   • Maternal iron deficiency anemia affecting pregnancy in second trimester, antepartum   • Encounter for supervision of normal first pregnancy in third trimester       GBS Status:   Group B Strep Culture   Date Value Ref Range Status   2022 No Group B Streptococcus isolated  Final         No Known Allergies       Flu Status: Declines  Her Delivery Plan is: Undecided    US today: no  Non Stress Test: Yes for HTN minutes > 20 minutes  non-stress test: NST: Reactive  indication: Hypertension       ROS -   Patient Reports : Cramping  Patient Denies: Loss of Fluid, Vaginal Spotting, Vision Changes, Headaches, Nausea , Vomiting  and Epigastric pain  Fetal Movement : normal  All other systems reviewed and are negative.       The additional following portions of the patient's history were reviewed and updated as appropriate: allergies, current medications, past family history, past medical history, past social history, past surgical history and problem list.    I have reviewed and agree with the HPI, ROS, and historical information as entered above. Joe Connor, APRN      EXAM:     Prenatal Vitals  BP: 148/98  Weight: 61.7 kg (136 lb)   Fetal Heart Rate: NST       Dilation/Effacement/Station  Dilation: 3  Effacement (%): 70      Urine Glucose Read-only: Negative  Urine Protein Read-only: Negative       Assessment and  Plan    Problem List Items Addressed This Visit        Cardiac and Vasculature    Essential hypertension - Primary    Overview     Taking Asa 81.     No BP medications         Relevant Orders    AlP+ALT+AST+Creat+LD+TBili+..       Gravid and     Encounter for supervision of normal first pregnancy in third trimester    Relevant Orders    POC Protein, Urine, Qualitative, Dipstick (Completed)    POC Glucose, Urine, Qualitative, Dipstick (Completed)       Hematology and Neoplasia    Maternal iron deficiency anemia affecting pregnancy in second trimester, antepartum    Relevant Medications    ferrous sulfate 140 (45 Fe) MG tablet controlled-release tablet       1. Pregnancy at 38w0d  2. Fetal status reassuring.   3. Pt with severe range BP's in office.   4. To  for continuous BP monitoring and preeclampsia labs  5. Plan reviewed with MENDEZ on call physician      Joe Connor, APRN  2023

## 2023-01-09 NOTE — H&P
H&P  CC- HTN         Subjective      Susan Mer Dickinson is a 27 y.o.  38w0d patient was seen today in office for NST and elevated BP's.     She denies LOF, vaginal spotting, headaches, vision changes, swelling. She reports adequate fetal movements, >10 movements in 10 hours.       She reports that she has been monitoring BP at home, and it averages 123-128/80s. In the office she was 140/100 and 148/98. She is not on medication.     Her prenatal care is complicated by (and status) : Chronic HTN. Her average BP has been 120's/80's.      Patient Active Problem List   Diagnosis   • Essential hypertension   • Maternal iron deficiency anemia affecting pregnancy in second trimester, antepartum   • Encounter for supervision of normal first pregnancy in third trimester         GBS Status:         Group B Strep Culture   Date Value Ref Range Status   2022 No Group B Streptococcus isolated   Final          No Known Allergies         Flu Status: Declines  Her Delivery Plan is: Undecided    US today: no  Non Stress Test: Yes for HTN minutes > 20 minutes  non-stress test: NST: Reactive  indication: Hypertension         ROS -   Patient Reports : Cramping  Patient Denies: Loss of Fluid, Vaginal Spotting, Vision Changes, Headaches, Nausea , Vomiting  and Epigastric pain  Fetal Movement : normal  All other systems reviewed and are negative.         The additional following portions of the patient's history were reviewed and updated as appropriate: allergies, current medications, past family history, past medical history, past social history, past surgical history and problem list.     I have reviewed and agree with the HPI, ROS, and historical information as entered above. Joe Connor, APRN        EXAM:      Prenatal Vitals  BP: 148/98  Weight: 61.7 kg (136 lb)   Fetal Heart Rate: NST       Dilation/Effacement/Station  Dilation: 3  Effacement (%): 70        Urine Glucose Read-only: Negative  Urine Protein  Read-only: Negative           Assessment      Assessment and Plan         Problem List Items Addressed This Visit               Cardiac and Vasculature     Essential hypertension - Primary     Overview       Taking Asa 81.      No BP medications           Relevant Orders     AlP+ALT+AST+Creat+LD+TBili+..          Gravid and      Encounter for supervision of normal first pregnancy in third trimester     Relevant Orders     POC Protein, Urine, Qualitative, Dipstick (Completed)     POC Glucose, Urine, Qualitative, Dipstick (Completed)          Hematology and Neoplasia     Maternal iron deficiency anemia affecting pregnancy in second trimester, antepartum     Relevant Medications     ferrous sulfate 140 (45 Fe) MG tablet controlled-release tablet         1. Pregnancy at 38w0d  2. Fetal status reassuring.   3. Pt with elevated BP's in office.   4. To  for continuous BP monitoring and preeclampsia labs  5. Plan reviewed with MENDEZ on call physician        Joe Connor, APRN  2023

## 2023-01-09 NOTE — PROGRESS NOTES
27 y.o.  at 38w   OB History        1    Para        Term                AB        Living           SAB        IAB        Ectopic        Molar        Multiple        Live Births                 Presents as an induction of labor due to gestational hypertension unfavorable cervix  Her primary OB requests a Del Castillo Bulb placement to initiate the induction of labor.    Fetal Heart Rate Assessment   Method:     Beats/min:     Baseline:     Varibility:     Accels:     Decels:     Tracing Category:       TOCO  SVE 2 cm/-1 vertex    A Del Castillo Bulb was placed without difficulties with 60 mL of sterile saline.  The patient tolerated the procedure well.

## 2023-01-10 ENCOUNTER — ANESTHESIA (OUTPATIENT)
Dept: LABOR AND DELIVERY | Facility: HOSPITAL | Age: 28
End: 2023-01-10
Payer: COMMERCIAL

## 2023-01-10 ENCOUNTER — ANESTHESIA EVENT (OUTPATIENT)
Dept: LABOR AND DELIVERY | Facility: HOSPITAL | Age: 28
End: 2023-01-10
Payer: COMMERCIAL

## 2023-01-10 PROCEDURE — C1755 CATHETER, INTRASPINAL: HCPCS

## 2023-01-10 PROCEDURE — 59400 OBSTETRICAL CARE: CPT | Performed by: OBSTETRICS & GYNECOLOGY

## 2023-01-10 PROCEDURE — 25010000002 FENTANYL CITRATE (PF) 50 MCG/ML SOLUTION: Performed by: ANESTHESIOLOGY

## 2023-01-10 PROCEDURE — 0 MAGNESIUM SULFATE 20 GM/500ML SOLUTION: Performed by: OBSTETRICS & GYNECOLOGY

## 2023-01-10 PROCEDURE — 25010000002 BUTORPHANOL PER 1 MG: Performed by: OBSTETRICS & GYNECOLOGY

## 2023-01-10 PROCEDURE — 25010000002 ROPIVACAINE PER 1 MG: Performed by: ANESTHESIOLOGY

## 2023-01-10 PROCEDURE — 0UQKXZZ REPAIR HYMEN, EXTERNAL APPROACH: ICD-10-PCS | Performed by: OBSTETRICS & GYNECOLOGY

## 2023-01-10 PROCEDURE — 51703 INSERT BLADDER CATH COMPLEX: CPT

## 2023-01-10 PROCEDURE — 59025 FETAL NON-STRESS TEST: CPT

## 2023-01-10 PROCEDURE — C1755 CATHETER, INTRASPINAL: HCPCS | Performed by: ANESTHESIOLOGY

## 2023-01-10 PROCEDURE — 25010000002 OXYTOCIN PER 10 UNITS: Performed by: OBSTETRICS & GYNECOLOGY

## 2023-01-10 RX ORDER — MISOPROSTOL 200 UG/1
800 TABLET ORAL ONCE AS NEEDED
Status: COMPLETED | OUTPATIENT
Start: 2023-01-10 | End: 2023-01-10

## 2023-01-10 RX ORDER — METOCLOPRAMIDE HYDROCHLORIDE 5 MG/ML
10 INJECTION INTRAMUSCULAR; INTRAVENOUS ONCE AS NEEDED
Status: DISCONTINUED | OUTPATIENT
Start: 2023-01-10 | End: 2023-01-10

## 2023-01-10 RX ORDER — BUPIVACAINE HYDROCHLORIDE 2.5 MG/ML
INJECTION, SOLUTION EPIDURAL; INFILTRATION; INTRACAUDAL AS NEEDED
Status: DISCONTINUED | OUTPATIENT
Start: 2023-01-10 | End: 2023-01-10 | Stop reason: SURG

## 2023-01-10 RX ORDER — CARBOPROST TROMETHAMINE 250 UG/ML
250 INJECTION, SOLUTION INTRAMUSCULAR
Status: DISCONTINUED | OUTPATIENT
Start: 2023-01-10 | End: 2023-01-10

## 2023-01-10 RX ORDER — BISACODYL 10 MG
10 SUPPOSITORY, RECTAL RECTAL DAILY PRN
Status: DISCONTINUED | OUTPATIENT
Start: 2023-01-11 | End: 2023-01-12 | Stop reason: HOSPADM

## 2023-01-10 RX ORDER — SODIUM CHLORIDE 0.9 % (FLUSH) 0.9 %
1-10 SYRINGE (ML) INJECTION AS NEEDED
Status: DISCONTINUED | OUTPATIENT
Start: 2023-01-10 | End: 2023-01-12 | Stop reason: HOSPADM

## 2023-01-10 RX ORDER — FAMOTIDINE 10 MG/ML
20 INJECTION, SOLUTION INTRAVENOUS ONCE AS NEEDED
Status: DISCONTINUED | OUTPATIENT
Start: 2023-01-10 | End: 2023-01-10

## 2023-01-10 RX ORDER — ONDANSETRON 2 MG/ML
4 INJECTION INTRAMUSCULAR; INTRAVENOUS ONCE AS NEEDED
Status: DISCONTINUED | OUTPATIENT
Start: 2023-01-10 | End: 2023-01-10

## 2023-01-10 RX ORDER — OXYTOCIN/0.9 % SODIUM CHLORIDE 30/500 ML
999 PLASTIC BAG, INJECTION (ML) INTRAVENOUS ONCE
Status: DISCONTINUED | OUTPATIENT
Start: 2023-01-10 | End: 2023-01-12 | Stop reason: HOSPADM

## 2023-01-10 RX ORDER — HYDROCORTISONE 25 MG/G
1 CREAM TOPICAL AS NEEDED
Status: DISCONTINUED | OUTPATIENT
Start: 2023-01-10 | End: 2023-01-12 | Stop reason: HOSPADM

## 2023-01-10 RX ORDER — OXYTOCIN/0.9 % SODIUM CHLORIDE 30/500 ML
250 PLASTIC BAG, INJECTION (ML) INTRAVENOUS CONTINUOUS
Status: ACTIVE | OUTPATIENT
Start: 2023-01-10 | End: 2023-01-10

## 2023-01-10 RX ORDER — MISOPROSTOL 200 UG/1
TABLET ORAL
Status: DISCONTINUED
Start: 2023-01-10 | End: 2023-01-12 | Stop reason: HOSPADM

## 2023-01-10 RX ORDER — DOCUSATE SODIUM 100 MG/1
100 CAPSULE, LIQUID FILLED ORAL 2 TIMES DAILY
Status: DISCONTINUED | OUTPATIENT
Start: 2023-01-10 | End: 2023-01-12 | Stop reason: HOSPADM

## 2023-01-10 RX ORDER — DIPHENHYDRAMINE HCL 25 MG
25 CAPSULE ORAL NIGHTLY PRN
Status: DISCONTINUED | OUTPATIENT
Start: 2023-01-10 | End: 2023-01-12 | Stop reason: HOSPADM

## 2023-01-10 RX ORDER — LIDOCAINE HYDROCHLORIDE AND EPINEPHRINE 15; 5 MG/ML; UG/ML
INJECTION, SOLUTION EPIDURAL AS NEEDED
Status: DISCONTINUED | OUTPATIENT
Start: 2023-01-10 | End: 2023-01-10 | Stop reason: SURG

## 2023-01-10 RX ORDER — DIPHENHYDRAMINE HYDROCHLORIDE 50 MG/ML
12.5 INJECTION INTRAMUSCULAR; INTRAVENOUS EVERY 8 HOURS PRN
Status: DISCONTINUED | OUTPATIENT
Start: 2023-01-10 | End: 2023-01-10

## 2023-01-10 RX ORDER — IBUPROFEN 600 MG/1
600 TABLET ORAL EVERY 6 HOURS PRN
Status: DISCONTINUED | OUTPATIENT
Start: 2023-01-10 | End: 2023-01-12 | Stop reason: HOSPADM

## 2023-01-10 RX ORDER — TRISODIUM CITRATE DIHYDRATE AND CITRIC ACID MONOHYDRATE 500; 334 MG/5ML; MG/5ML
30 SOLUTION ORAL ONCE
Status: DISCONTINUED | OUTPATIENT
Start: 2023-01-10 | End: 2023-01-10

## 2023-01-10 RX ORDER — METHYLERGONOVINE MALEATE 0.2 MG/ML
200 INJECTION INTRAVENOUS ONCE AS NEEDED
Status: DISCONTINUED | OUTPATIENT
Start: 2023-01-10 | End: 2023-01-10

## 2023-01-10 RX ORDER — FENTANYL CITRATE 50 UG/ML
INJECTION, SOLUTION INTRAMUSCULAR; INTRAVENOUS AS NEEDED
Status: DISCONTINUED | OUTPATIENT
Start: 2023-01-10 | End: 2023-01-10 | Stop reason: SURG

## 2023-01-10 RX ORDER — ROPIVACAINE HYDROCHLORIDE 2 MG/ML
15 INJECTION, SOLUTION EPIDURAL; INFILTRATION; PERINEURAL CONTINUOUS
Status: DISCONTINUED | OUTPATIENT
Start: 2023-01-10 | End: 2023-01-10

## 2023-01-10 RX ORDER — EPHEDRINE SULFATE 5 MG/ML
10 INJECTION INTRAVENOUS
Status: DISCONTINUED | OUTPATIENT
Start: 2023-01-10 | End: 2023-01-10

## 2023-01-10 RX ORDER — MAGNESIUM SULFATE HEPTAHYDRATE 40 MG/ML
2 INJECTION, SOLUTION INTRAVENOUS CONTINUOUS
Status: DISCONTINUED | OUTPATIENT
Start: 2023-01-10 | End: 2023-01-12 | Stop reason: HOSPADM

## 2023-01-10 RX ADMIN — SODIUM CHLORIDE, POTASSIUM CHLORIDE, SODIUM LACTATE AND CALCIUM CHLORIDE 125 ML/HR: 600; 310; 30; 20 INJECTION, SOLUTION INTRAVENOUS at 02:01

## 2023-01-10 RX ADMIN — LIDOCAINE HYDROCHLORIDE AND EPINEPHRINE 3 ML: 15; 5 INJECTION, SOLUTION EPIDURAL at 06:07

## 2023-01-10 RX ADMIN — Medication 1 APPLICATION: at 16:39

## 2023-01-10 RX ADMIN — BUTORPHANOL TARTRATE 2 MG: 2 INJECTION, SOLUTION INTRAMUSCULAR; INTRAVENOUS at 03:46

## 2023-01-10 RX ADMIN — LIDOCAINE HYDROCHLORIDE AND EPINEPHRINE 2 ML: 15; 5 INJECTION, SOLUTION EPIDURAL at 06:08

## 2023-01-10 RX ADMIN — MISOPROSTOL 800 MCG: 200 TABLET ORAL at 10:14

## 2023-01-10 RX ADMIN — MAGNESIUM SULFATE HEPTAHYDRATE 2 G/HR: 40 INJECTION, SOLUTION INTRAVENOUS at 16:36

## 2023-01-10 RX ADMIN — OXYTOCIN 2 MILLI-UNITS/MIN: 10 INJECTION, SOLUTION INTRAMUSCULAR; INTRAVENOUS at 00:13

## 2023-01-10 RX ADMIN — Medication: at 16:39

## 2023-01-10 RX ADMIN — BUPIVACAINE HYDROCHLORIDE 12 ML: 2.5 INJECTION, SOLUTION EPIDURAL; INFILTRATION; INTRACAUDAL; PERINEURAL at 06:10

## 2023-01-10 RX ADMIN — ROPIVACAINE HYDROCHLORIDE 15 ML/HR: 2 INJECTION, SOLUTION EPIDURAL; INFILTRATION at 06:16

## 2023-01-10 RX ADMIN — SODIUM CHLORIDE, POTASSIUM CHLORIDE, SODIUM LACTATE AND CALCIUM CHLORIDE 125 ML/HR: 600; 310; 30; 20 INJECTION, SOLUTION INTRAVENOUS at 11:35

## 2023-01-10 RX ADMIN — IBUPROFEN 600 MG: 600 TABLET ORAL at 20:33

## 2023-01-10 RX ADMIN — WITCH HAZEL 1 PAD: 500 SOLUTION RECTAL; TOPICAL at 16:39

## 2023-01-10 RX ADMIN — DOCUSATE SODIUM 100 MG: 100 CAPSULE, LIQUID FILLED ORAL at 20:28

## 2023-01-10 RX ADMIN — FENTANYL CITRATE 100 MCG: 50 INJECTION, SOLUTION INTRAMUSCULAR; INTRAVENOUS at 06:09

## 2023-01-10 NOTE — L&D DELIVERY NOTE
[facility]   Vaginal Delivery Note    Patient Name: Susan Dickinson  : 1995  MRN: [id number]    Date of Delivery: 1/10/2023     Diagnosis     Pre & Post-Delivery:  Intrauterine pregnancy at 38w1d  Labor status: Induced Onset of Labor     Gestational HTN             Problem List    Transfer to Postpartum     Review the Delivery Report for details.     Delivery     Delivery: Vaginal, Spontaneous     YOB: 2021    Time of Birth:  Gestational Age 10:04 AM   38w1d     Anesthesia: Epidural     Delivering clinician: Wendi Miranda    Forceps?   No   Vacuum? No    Shoulder dystocia present: No        Delivery narrative:   over small hymenal laceration repaired with 2-0 Vicryl.  Rectal mucosa and sphincter intact.  Placenta spontaneous and intact.  Uterus explored and empty.  Mild to mod atony responsive to massage, Cytotec.  Severe BP range will start IV magnesium when able.        Infant     Findings: male  infant     Infant observations: Weight: 3210 g (7 lb 1.2 oz)   Length: 19  in  Observations/Comments:        Apgars: 8  @ 1 minute /    9  @ 5 minutes   Infant Name: Melissa     Placenta & Cord         Placenta delivered  Spontaneous  at   10/8/2022 10:37 AM     Cord: 3 vessels  present.   Nuchal Cord?  no   Cord blood obtained: Yes    Cord gases obtained:  No      Repair      Episiotomy: None     No    Lacerations: As above   Estimated Blood Loss: Est. Blood Loss (mL): 300 mL (Filed from Delivery Summary) (01/10/23 1004)300 cc     Quantitative Blood Loss:  300 cc  QBL from VAG DEL: 69 (01/10/23 1213)     Complications     none    Disposition     Mother to Mother Baby/Postpartum  in stable condition currently.  Baby to remains with mom  in stable condition currently.    Wendi Miranda MD  01/10/23  13:52 EST

## 2023-01-10 NOTE — ANESTHESIA PROCEDURE NOTES
Labor Epidural      Patient reassessed immediately prior to procedure    Patient location during procedure: OB  Performed By  Anesthesiologist: Walker Caldwell DO  Preanesthetic Checklist  Completed: patient identified, IV checked, site marked, risks and benefits discussed, surgical consent, monitors and equipment checked, pre-op evaluation and timeout performed  Additional Notes  Dural puncture performed with a 5 inch 25 g Jorge needle, clear CSF.  Prep:  Pt Position:sitting  Sterile Tech:gloves, mask, sterile barrier and cap  Prep:chlorhexidine gluconate and isopropyl alcohol  Monitoring:blood pressure monitoring and continuous pulse oximetry  Epidural Block Procedure:  Approach:midline  Guidance:landmark technique and palpation technique  Location:L3-L4  Needle Type:Tuohy  Needle Gauge:17 G  Loss of Resistance Medium: air  Loss of Resistance: 4cm  Cath Depth at skin:9 cm  Paresthesia: none  Aspiration:negative  Test Dose:negative  Number of Attempts: 1  Post Assessment:  Dressing:secured with tape and occlusive dressing applied (Tegaderm Placed)  Pt Tolerance:patient tolerated the procedure well with no apparent complications  Complications:no

## 2023-01-10 NOTE — PAYOR COMM NOTE
"Chace Dickinson (27 y.o. Female)     Ref#[id number]    Delivery information.    From:Lubna Villegas LPN, Utilization Review  Phone #[phone]  Fax #[phone]      Date of Birth   1995    Social Security Number       Address   [address] [place] [address]    Home Phone   [phone]    MRN   [id number]       Amish   Yarsani    Marital Status   Single                            Admission Date   1/9/23    Admission Type   Elective    Admitting Provider   Wendi Miranda MD    Attending Provider   Wendi Miranda MD    Department, Room/Bed   [facility] ANTEPARTUM, N332/1       Discharge Date       Discharge Disposition       Discharge Destination                               Attending Provider: Wendi Miranda MD    Allergies: No Known Allergies    Isolation: None   Infection: None   Code Status: Not on file    Ht: 157.5 cm (62\")   Wt: 61.7 kg (136 lb)    Admission Cmt: None   Principal Problem: Gestational HTN [O13.9]                 Active Insurance as of 1/9/2023     Primary Coverage     Payor Plan Insurance Group Employer/Plan Group    HUMANA HUMANA [id number]     Payor Plan Address Payor Plan Phone Number Payor Plan Fax Number Effective Dates    [address] [address] [phone]  1/1/2022 - None Entered    [facility] [address]       Subscriber Name Subscriber Birth Date Member ID       CHACE DICKINSON 1995 [id number]           Secondary Coverage     Payor Plan Insurance Group Employer/Plan Group    [facility] COMMUNITY PLAN [facility] COMMUNITY PLAN [facility] [place]     Payor Plan Address Payor Plan Phone Number Payor Plan Fax Number Effective Dates    [address]   6/1/2022 - None Entered    [facility] [address]       Subscriber Name Subscriber Birth Date Member ID       CHACE DICKINSON 1995 [id number]                 Emergency Contacts      (Rel.) Home Phone Work Phone Mobile Phone    FILIPPO DICKINSON (Mother) -- -- [phone]    " Adan Guajardo (Partner) -- -- [phone]    Genesis Snyder (Relative) -- -- [phone]            Insurance Information                HUMANA/HUMANA Phone: [phone]    Subscriber: Susan Dickinson Subscriber#: [id number]    Group#: [id number] Precert#: --        [facility] [place] [place] Phone: --    Subscriber: Susan Dickinson Subscriber#: [id number]    Group#: [id number] Precert#: --             History & Physical      Wendi Miranda MD at 23 1456          H&P  CC- HTN         Subjective      Susan Dickinson is a 27 y.o.  38w0d patient was seen today in office for NST and elevated BP's.     She denies LOF, vaginal spotting, headaches, vision changes, swelling. She reports adequate fetal movements, >10 movements in 10 hours.       She reports that she has been monitoring BP at home, and it averages 123-128/80s. In the office she was 140/100 and 148/98. She is not on medication.     Her prenatal care is complicated by (and status) : Chronic HTN. Her average BP has been 120's/80's.      Patient Active Problem List   Diagnosis   • Essential hypertension   • Maternal iron deficiency anemia affecting pregnancy in second trimester, antepartum   • Encounter for supervision of normal first pregnancy in third trimester         GBS Status:         Group B Strep Culture   Date Value Ref Range Status   2022 No Group B Streptococcus isolated   Final          No Known Allergies         Flu Status: Declines  Her Delivery Plan is: Undecided    US today: no  Non Stress Test: Yes for HTN minutes > 20 minutes  non-stress test: NST: Reactive  indication: Hypertension         ROS -   Patient Reports : Cramping  Patient Denies: Loss of Fluid, Vaginal Spotting, Vision Changes, Headaches, Nausea , Vomiting  and Epigastric pain  Fetal Movement : normal  All other systems reviewed and are negative.         The additional following portions of the patient's history were reviewed and updated as  appropriate: allergies, current medications, past family history, past medical history, past social history, past surgical history and problem list.     I have reviewed and agree with the HPI, ROS, and historical information as entered above. Joe Connor, APRN        EXAM:      Prenatal Vitals  BP: 148/98  Weight: 61.7 kg (136 lb)   Fetal Heart Rate: NST       Dilation/Effacement/Station  Dilation: 3  Effacement (%): 70        Urine Glucose Read-only: Negative  Urine Protein Read-only: Negative           Assessment      Assessment and Plan         Problem List Items Addressed This Visit               Cardiac and Vasculature     Essential hypertension - Primary     Overview       Taking Asa 81.      No BP medications           Relevant Orders     AlP+ALT+AST+Creat+LD+TBili+..          Gravid and      Encounter for supervision of normal first pregnancy in third trimester     Relevant Orders     POC Protein, Urine, Qualitative, Dipstick (Completed)     POC Glucose, Urine, Qualitative, Dipstick (Completed)          Hematology and Neoplasia     Maternal iron deficiency anemia affecting pregnancy in second trimester, antepartum     Relevant Medications     ferrous sulfate 140 (45 Fe) MG tablet controlled-release tablet         1. Pregnancy at 38w0d  2. Fetal status reassuring.   3. Pt with elevated BP's in office.   4. To  for continuous BP monitoring and preeclampsia labs  5. Plan reviewed with MENDEZ on call physician        JOLENE Cotton  2023      Electronically signed by Wendi Miranda MD at 23 1508         Facility-Administered Medications as of 1/10/2023   Medication Dose Route Frequency Provider Last Rate Last Admin   • butorphanol (STADOL) injection 2 mg  2 mg Intravenous Q2H PRN Geronimo Silva MD   2 mg at 01/10/23 0346   • carboprost (HEMABATE) injection 250 mcg  250 mcg Intramuscular Q15 Min PRN Wally Castellanos, DO       • diphenhydrAMINE (BENADRYL) injection  12.5 mg  12.5 mg Intravenous Q8H PRN Walker Caldwell, DO       • ePHEDrine Sulfate 5 MG/ML injection 10 mg  10 mg Intravenous Q10 Min PRN Walker Caldwell, DO       • famotidine (PEPCID) injection 20 mg  20 mg Intravenous Once PRN Walker Caldwell, DO       • lactated ringers bolus 1,000 mL  1,000 mL Intravenous Once PRN Wally Castellanos, DO       • lactated ringers bolus 1,000 mL  1,000 mL Intravenous PRN Walker Caldwell, DO       • lactated ringers infusion  125 mL/hr Intravenous Continuous Wally Castellanos  mL/hr at 01/10/23 1135 125 mL/hr at 01/10/23 1135   • magnesium sulfate 20 GM/500ML infusion  2 g/hr Intravenous Continuous Wendi Miranda MD 50 mL/hr at 01/10/23 1210 2 g/hr at 01/10/23 1210   • [COMPLETED] magnesium sulfate bolus from bag 0.04 g/mL solution 6 g  6 g Intravenous Once Wendi Miranda  mL/hr at 01/10/23 1137 6 g at 01/10/23 1137   • methylergonovine (METHERGINE) injection 200 mcg  200 mcg Intramuscular Once PRN Wally Castellanos, DO       • metoclopramide (REGLAN) injection 10 mg  10 mg Intravenous Once PRN Walker Caldwell, DO       • mineral oil liquid 30 mL  30 mL Topical Once Wally Castellanos DO       • miSOPROStol (CYTOTEC) 200 MCG tablet  - ADS Override Pull            • [COMPLETED] miSOPROStol (CYTOTEC) tablet 800 mcg  800 mcg Rectal Once PRN Wally Castellanos DO   800 mcg at 01/10/23 1014   • ondansetron (ZOFRAN) tablet 4 mg  4 mg Oral Q6H PRN Wally Castellanos DO        Or   • ondansetron (ZOFRAN) injection 4 mg  4 mg Intravenous Q6H PRN Wally Castellanos, DO       • ondansetron (ZOFRAN) injection 4 mg  4 mg Intravenous Once PRN Walker Caldwell, DO       • oxytocin (PITOCIN) 30 units in 0.9% sodium chloride 500 mL (premix)  999 mL/hr Intravenous Once Wally Castellanos  mL/hr at 01/10/23 1008 999 mL/hr at 01/10/23 1008    Followed by   • [] oxytocin (PITOCIN) 30 units in 0.9% sodium chloride 500 mL (premix)  250 mL/hr Intravenous  Continuous Wally Castellanos  mL/hr at 01/10/23 1023 250 mL/hr at 01/10/23 1023   • oxytocin (PITOCIN) 30 units in 0.9% sodium chloride 500 mL (premix)  2-20 lola-units/min Intravenous Titrated Nury Tran MD 6 mL/hr at 01/10/23 0833 6 lola-units/min at 01/10/23 0833   • ropivacaine (NAROPIN) 0.2 % injection  15 mL/hr Epidural Continuous Walker Caldwell DO   Stopped at 01/10/23 1015   • Sod Citrate-Citric Acid (BICITRA) solution 30 mL  30 mL Oral Once Walker Caldwell DO           Orders (last 48 hrs)      Start     Ordered    01/10/23 1215  magnesium sulfate bolus from bag 0.04 g/mL solution 6 g  Once         01/10/23 1127    01/10/23 1215  magnesium sulfate 20 GM/500ML infusion  Continuous         01/10/23 1127    01/10/23 1130  oxytocin (PITOCIN) 30 units in 0.9% sodium chloride 500 mL (premix)  Continuous        See Hyperspace for full Linked Orders Report.    01/10/23 1024    01/10/23 1115  oxytocin (PITOCIN) 30 units in 0.9% sodium chloride 500 mL (premix)  Once        See Hyperspace for full Linked Orders Report.    01/10/23 1024    01/10/23 1025  Notify Provider (Specified)  Until Discontinued         01/10/23 1024    01/10/23 1025  Vital Signs Per Hospital Policy  Per Hospital Policy         01/10/23 1024    01/10/23 1025  Fundal & Lochia Check  Per Order Details        Comments: Every 15 Minutes x4, Then Every 30 Minutes x2, Then Every Shift    01/10/23 1024    01/10/23 1025  Diet: Regular/House Diet; Texture: Regular Texture (IDDSI 7); Fluid Consistency: Thin (IDDSI 0)  Diet Effective Now         01/10/23 1024    01/10/23 1025  Nurse May Remove Epidural Catheter After Delivery  Continuous         01/10/23 1024    01/10/23 1025  Transfer to postpartum when discharge criteria met.  Until Discontinued         01/10/23 1024    01/10/23 1024  Fundal & Lochia Check  Every Shift       01/10/23 1024    01/10/23 1024  methylergonovine (METHERGINE) injection 200 mcg  Once As Needed          01/10/23 1024    01/10/23 1024  carboprost (HEMABATE) injection 250 mcg  Every 15 Minutes PRN         01/10/23 1024    01/10/23 1024  miSOPROStol (CYTOTEC) tablet 800 mcg  Once As Needed         01/10/23 1024    01/10/23 1012  miSOPROStol (CYTOTEC) 200 MCG tablet  - ADS Override Pull        Note to Pharmacy: Created by cabinet override    01/10/23 1012    01/10/23 0630  ropivacaine (NAROPIN) 0.2 % injection  Continuous         01/10/23 0532    01/10/23 0630  Sod Citrate-Citric Acid (BICITRA) solution 30 mL  Once         01/10/23 0532    01/10/23 0533  Vital Signs Per Anesthesia Guidelines  Per Hospital Policy        Comments: Every 3 Minutes x 20 Minutes following epidural dosing, then if stable every 15 Minutes    01/10/23 0532    01/10/23 0533  Start IV (16 or 18 Gauge)  Once         01/10/23 0532    01/10/23 0533  Fetal Heart Rate Monitor  Once         01/10/23 0532    01/10/23 0533  Nurse or Anesthesiologist to Remain With Patient for 15 Minutes Following Dosing  Continuous         01/10/23 0532    01/10/23 0533  Facilitate Maternal Position on Side & Maintain Uterine Displacement  Continuous         01/10/23 0532    01/10/23 0533  Consult Anesthesia Prior to Changing Epidural Infusion / Rate  Continuous         01/10/23 0532    01/10/23 0532  lactated ringers bolus 1,000 mL  As Needed         01/10/23 0532    01/10/23 0532  ePHEDrine Sulfate 5 MG/ML injection 10 mg  Every 10 Minutes PRN         01/10/23 0532    01/10/23 0532  diphenhydrAMINE (BENADRYL) injection 12.5 mg  Every 8 Hours PRN         01/10/23 0532    01/10/23 0532  metoclopramide (REGLAN) injection 10 mg  Once As Needed         01/10/23 0532    01/10/23 0532  ondansetron (ZOFRAN) injection 4 mg  Once As Needed         01/10/23 0532    01/10/23 0532  famotidine (PEPCID) injection 20 mg  Once As Needed         01/10/23 0532    01/10/23 0342  butorphanol (STADOL) injection 2 mg  Every 2 Hours PRN         01/10/23 0342    01/10/23 0045  oxytocin  (PITOCIN) 30 units in 0.9% sodium chloride 500 mL (premix)  Titrated         01/09/23 2347    01/09/23 1745  lactated ringers infusion  Continuous         01/09/23 1647    01/09/23 1745  mineral oil liquid 30 mL  Once         01/09/23 1647    01/09/23 1647  Del Castillo Bulb Cervical Ripening Without Infusion  Once        Comments: Have Laborist Place Cervical Del Castillo Without Infusion.    If Unable to Place Del Castillo, Start Low Dose Pitocin Drip Overnight, Then Increase Per Protocol at 0500 Next Morning    01/09/23 1647    01/09/23 1646  ondansetron (ZOFRAN) tablet 4 mg  Every 6 Hours PRN        See Hyperspace for full Linked Orders Report.    01/09/23 1647    01/09/23 1646  ondansetron (ZOFRAN) injection 4 mg  Every 6 Hours PRN        See Hyperspace for full Linked Orders Report.    01/09/23 1647    01/09/23 1646  Admit To Obstetrics Inpatient  Once         01/09/23 1647    01/09/23 1646  Obtain Informed Consent  Once         01/09/23 1647    01/09/23 1646  Vital Signs Per Hospital Policy  Per Hospital Policy         01/09/23 1647    01/09/23 1646  Mini-Prep Prior to Delivery  Once         01/09/23 1647    01/09/23 1646  Continuous Fetal Monitoring With NST on Admission and Prior to Initiation of Oxytocin.  Per Order Details        Comments: Continuous Fetal Monitoring With NST on Admission & Prior to Initiation of Oxytocin.    01/09/23 1647    01/09/23 1646  External Uterine Contraction Monitoring  Per Hospital Policy         01/09/23 1647    01/09/23 1646  Notify Provider (Specified)  Until Discontinued         01/09/23 1647    01/09/23 1646  Notify Provider of Tachysystole (Per Hospital Algorithm)  Until Discontinued         01/09/23 1647    01/09/23 1646  Notify Provider if Membranes Ruptured, Bleeding Greater Than 1 Pad Per Hour, Fetal Heart Tone Abnormality or Severe Pain  Until Discontinued         01/09/23 1647    01/09/23 1646  Initiate Group Beta Strep (GBS) Prophylaxis Protocol, If Criteria Met  Continuous         Comments: NO TREATMENT RECOMMENDED IF: 1) Maternal GBS Status Known Negative 2) Scheduled  Birth With Intact Membranes, Not in Labor 3) Maternal GBS Status Unknown, No Risk Factors  TREAT WITH ANTIBIOTICS IF:  1) Maternal GBS Status Known Positive 2) Maternal GBS Status Unknown With Risk Factors: a)  Previous Infant Affected By GBS Infection b) GBS Urinary Tract Infection (UTI) or Bacteriuria During Pregnancy c) Unexplained Maternal Fever (100.4F (38C) or Greater) During Labor d)  Prolonged Rupture of Membranes (18 or More Hours) e) Gestational Age Less Than 37 Weeks    23 16423 164  NPO Diet NPO Type: Ice Chips  Diet Effective Now,   Status:  Canceled         23 1647    23 164  VTE Prophylaxis Not Indicated: Reduced Mobility (3); </= 3 (Low Risk)  Once         23 16423 1645  lactated ringers bolus 1,000 mL  Once As Needed         23 1647    23 1524  ABO RH Specimen Verification  STAT         23 1523    23 1445  Type & Screen  STAT         23 1445    23 1445  CBC (No Diff)  STAT         23 1445    23 1445  Preeclampsia Panel  STAT         23 1445    23 1155  oxytocin (PITOCIN) 30 units in 0.9% sodium chloride 500 mL (premix)  Titrated,   Status:  Discontinued         23 164    Unscheduled  Position Change - For Intra-Uterine Resusitation for Hypertonus, HyperStimulation or Non-Reassuring Fetal Status  As Needed       23 1647              Operative/Procedure Notes (last 48 hours)  Notes from 23 1328 through 01/10/23 1328   No notes of this type exist for this encounter.          Physician Progress Notes (last 48 hours)      Wally Castellanos, DO at 23 1833          27 y.o.  at 38w   OB History        1    Para        Term                AB        Living           SAB        IAB        Ectopic        Molar        Multiple        Live Births                  Presents as an induction of labor due to gestational hypertension unfavorable cervix  Her primary OB requests a Del Castillo Bulb placement to initiate the induction of labor.    Fetal Heart Rate Assessment   Method:     Beats/min:     Baseline:     Varibility:     Accels:     Decels:     Tracing Category:       TOCO  SVE 2 cm/-1 vertex    A Del Castillo Bulb was placed without difficulties with 60 mL of sterile saline.  The patient tolerated the procedure well.    Electronically signed by Wally Castellanos DO at 01/09/23 4438

## 2023-01-10 NOTE — PAYOR COMM NOTE
"Susan Dickinson (27 y.o. Female)     Ref#[id number]    Delivery information:  Vaginal delivery 1/10/23 @ 10:04  Male  Wt 3210 gms  Apgars 8/9    From:Lubna Villegas LPN, Utilization Review  Phone #[phone]  Fax #[phone]      Date of Birth   1995    Social Security Number       Address   [address] [place] [address]    Home Phone   [phone]    MRN   [id number]       Druze   Islam    Marital Status   Single                            Admission Date   1/9/23    Admission Type   Elective    Admitting Provider   Wendi Miranda MD    Attending Provider   Wendi Miranda MD    Department, Room/Bed   [facility] ANTEPARTUM, N332/1       Discharge Date       Discharge Disposition       Discharge Destination                               Attending Provider: Wendi Miranda MD    Allergies: No Known Allergies    Isolation: None   Infection: None   Code Status: Not on file    Ht: 157.5 cm (62\")   Wt: 61.7 kg (136 lb)    Admission Cmt: None   Principal Problem: Gestational HTN [O13.9]                 Active Insurance as of 1/9/2023     Primary Coverage     Payor Plan Insurance Group Employer/Plan Group    HUMANA HUMANA [id number]     Payor Plan Address Payor Plan Phone Number Payor Plan Fax Number Effective Dates    [address] [address] [phone]  1/1/2022 - None Entered    [facility] [address]       Subscriber Name Subscriber Birth Date Member ID       SUSAN DICKINSON 1995 [id number]           Secondary Coverage     Payor Plan Insurance Group Employer/Plan Group    [facility] COMMUNITY PLAN [facility] COMMUNITY PLAN [facility] [place]     Payor Plan Address Payor Plan Phone Number Payor Plan Fax Number Effective Dates    [address]   6/1/2022 - None Entered    [facility] [phone]       Subscriber Name Subscriber Birth Date Member ID       SUSAN DICKINSON 1995 [id number]                 Emergency Contacts      (Rel.) Home Phone Work Phone " Mobile Phone    FILIPPO DICKINSON (Mother) -- -- [phone]    Adan Guajardo (Partner) -- -- [phone]    Genesis Snyder (Relative) -- -- [phone]            Insurance Information                HUMANA/HUMANA Phone: [phone]    Subscriber: Susan Dickinson Subscriber#: [id number]    Group#: [id number] Precert#: --        [facility] [place] [place] Phone: --    Subscriber: Susan Dickinson Subscriber#: [id number]    Group#: [id number] Precert#: --             History & Physical      Wendi Miranda MD at 23 1456          H&P  CC- HTN         Subjective      Susan Dickinson is a 27 y.o.  38w0d patient was seen today in office for NST and elevated BP's.     She denies LOF, vaginal spotting, headaches, vision changes, swelling. She reports adequate fetal movements, >10 movements in 10 hours.       She reports that she has been monitoring BP at home, and it averages 123-128/80s. In the office she was 140/100 and 148/98. She is not on medication.     Her prenatal care is complicated by (and status) : Chronic HTN. Her average BP has been 120's/80's.      Patient Active Problem List   Diagnosis   • Essential hypertension   • Maternal iron deficiency anemia affecting pregnancy in second trimester, antepartum   • Encounter for supervision of normal first pregnancy in third trimester         GBS Status:         Group B Strep Culture   Date Value Ref Range Status   2022 No Group B Streptococcus isolated   Final          No Known Allergies         Flu Status: Declines  Her Delivery Plan is: Undecided    US today: no  Non Stress Test: Yes for HTN minutes > 20 minutes  non-stress test: NST: Reactive  indication: Hypertension         ROS -   Patient Reports : Cramping  Patient Denies: Loss of Fluid, Vaginal Spotting, Vision Changes, Headaches, Nausea , Vomiting  and Epigastric pain  Fetal Movement : normal  All other systems reviewed and are negative.         The additional following  portions of the patient's history were reviewed and updated as appropriate: allergies, current medications, past family history, past medical history, past social history, past surgical history and problem list.     I have reviewed and agree with the HPI, ROS, and historical information as entered above. Joe Connor, APRN        EXAM:      Prenatal Vitals  BP: 148/98  Weight: 61.7 kg (136 lb)   Fetal Heart Rate: NST       Dilation/Effacement/Station  Dilation: 3  Effacement (%): 70        Urine Glucose Read-only: Negative  Urine Protein Read-only: Negative           Assessment      Assessment and Plan         Problem List Items Addressed This Visit               Cardiac and Vasculature     Essential hypertension - Primary     Overview       Taking Asa 81.      No BP medications           Relevant Orders     AlP+ALT+AST+Creat+LD+TBili+..          Gravid and      Encounter for supervision of normal first pregnancy in third trimester     Relevant Orders     POC Protein, Urine, Qualitative, Dipstick (Completed)     POC Glucose, Urine, Qualitative, Dipstick (Completed)          Hematology and Neoplasia     Maternal iron deficiency anemia affecting pregnancy in second trimester, antepartum     Relevant Medications     ferrous sulfate 140 (45 Fe) MG tablet controlled-release tablet         1. Pregnancy at 38w0d  2. Fetal status reassuring.   3. Pt with elevated BP's in office.   4. To  for continuous BP monitoring and preeclampsia labs  5. Plan reviewed with MENDEZ on call physician        JOLENE Cotton  2023      Electronically signed by Wendi Miranda MD at 23 1508         Facility-Administered Medications as of 1/10/2023   Medication Dose Route Frequency Provider Last Rate Last Admin   • butorphanol (STADOL) injection 2 mg  2 mg Intravenous Q2H PRN Geronimo Silva MD   2 mg at 01/10/23 2576   • carboprost (HEMABATE) injection 250 mcg  250 mcg Intramuscular Q15 Min PRN  Wally Castellanos, DO       • diphenhydrAMINE (BENADRYL) injection 12.5 mg  12.5 mg Intravenous Q8H PRN Walker Caldwell DO       • ePHEDrine Sulfate 5 MG/ML injection 10 mg  10 mg Intravenous Q10 Min PRN Walker Caldwell, DO       • famotidine (PEPCID) injection 20 mg  20 mg Intravenous Once PRN Walker Caldwell, DO       • lactated ringers bolus 1,000 mL  1,000 mL Intravenous Once PRN Wally Castellanos, DO       • lactated ringers bolus 1,000 mL  1,000 mL Intravenous PRN Walker Caldwell DO       • lactated ringers infusion  125 mL/hr Intravenous Continuous Wally Castellanos  mL/hr at 01/10/23 1135 125 mL/hr at 01/10/23 1135   • magnesium sulfate 20 GM/500ML infusion  2 g/hr Intravenous Continuous Wendi Miranda MD 50 mL/hr at 01/10/23 1210 2 g/hr at 01/10/23 1210   • [COMPLETED] magnesium sulfate bolus from bag 0.04 g/mL solution 6 g  6 g Intravenous Once Wendi Miranda  mL/hr at 01/10/23 1137 6 g at 01/10/23 1137   • methylergonovine (METHERGINE) injection 200 mcg  200 mcg Intramuscular Once PRN Wally Castellanos, DO       • metoclopramide (REGLAN) injection 10 mg  10 mg Intravenous Once PRN Walker Caldwell DO       • mineral oil liquid 30 mL  30 mL Topical Once Wally Castellanos DO       • miSOPROStol (CYTOTEC) 200 MCG tablet  - ADS Override Pull            • [COMPLETED] miSOPROStol (CYTOTEC) tablet 800 mcg  800 mcg Rectal Once PRN Wally Castellanos DO   800 mcg at 01/10/23 1014   • ondansetron (ZOFRAN) tablet 4 mg  4 mg Oral Q6H PRN Wally Castellanos DO        Or   • ondansetron (ZOFRAN) injection 4 mg  4 mg Intravenous Q6H PRN Wally Castellanos, DO       • ondansetron (ZOFRAN) injection 4 mg  4 mg Intravenous Once PRN McCoun, Walker T, DO       • oxytocin (PITOCIN) 30 units in 0.9% sodium chloride 500 mL (premix)  999 mL/hr Intravenous Once Wally Castellanos,  999 mL/hr at 01/10/23 1008 999 mL/hr at 01/10/23 1008    Followed by   • [] oxytocin (PITOCIN) 30 units  in 0.9% sodium chloride 500 mL (premix)  250 mL/hr Intravenous Continuous Wally Castellanos  mL/hr at 01/10/23 1023 250 mL/hr at 01/10/23 1023   • oxytocin (PITOCIN) 30 units in 0.9% sodium chloride 500 mL (premix)  2-20 lola-units/min Intravenous Titrated Nury Tran MD 6 mL/hr at 01/10/23 0833 6 olla-units/min at 01/10/23 0833   • ropivacaine (NAROPIN) 0.2 % injection  15 mL/hr Epidural Continuous Walker Caldwell DO   Stopped at 01/10/23 1015   • Sod Citrate-Citric Acid (BICITRA) solution 30 mL  30 mL Oral Once Walker Caldwell DO           Orders (last 48 hrs)      Start     Ordered    01/10/23 1215  magnesium sulfate bolus from bag 0.04 g/mL solution 6 g  Once         01/10/23 1127    01/10/23 1215  magnesium sulfate 20 GM/500ML infusion  Continuous         01/10/23 1127    01/10/23 1130  oxytocin (PITOCIN) 30 units in 0.9% sodium chloride 500 mL (premix)  Continuous        See Hyperspace for full Linked Orders Report.    01/10/23 1024    01/10/23 1115  oxytocin (PITOCIN) 30 units in 0.9% sodium chloride 500 mL (premix)  Once        See Hyperspace for full Linked Orders Report.    01/10/23 1024    01/10/23 1025  Notify Provider (Specified)  Until Discontinued         01/10/23 1024    01/10/23 1025  Vital Signs Per Hospital Policy  Per Hospital Policy         01/10/23 1024    01/10/23 1025  Fundal & Lochia Check  Per Order Details        Comments: Every 15 Minutes x4, Then Every 30 Minutes x2, Then Every Shift    01/10/23 1024    01/10/23 1025  Diet: Regular/House Diet; Texture: Regular Texture (IDDSI 7); Fluid Consistency: Thin (IDDSI 0)  Diet Effective Now         01/10/23 1024    01/10/23 1025  Nurse May Remove Epidural Catheter After Delivery  Continuous         01/10/23 1024    01/10/23 1025  Transfer to postpartum when discharge criteria met.  Until Discontinued         01/10/23 1024    01/10/23 1024  Fundal & Lochia Check  Every Shift       01/10/23 1024    01/10/23 1024   methylergonovine (METHERGINE) injection 200 mcg  Once As Needed         01/10/23 1024    01/10/23 1024  carboprost (HEMABATE) injection 250 mcg  Every 15 Minutes PRN         01/10/23 1024    01/10/23 1024  miSOPROStol (CYTOTEC) tablet 800 mcg  Once As Needed         01/10/23 1024    01/10/23 1012  miSOPROStol (CYTOTEC) 200 MCG tablet  - ADS Override Pull        Note to Pharmacy: Created by cabinet override    01/10/23 1012    01/10/23 0630  ropivacaine (NAROPIN) 0.2 % injection  Continuous         01/10/23 0532    01/10/23 0630  Sod Citrate-Citric Acid (BICITRA) solution 30 mL  Once         01/10/23 0532    01/10/23 0533  Vital Signs Per Anesthesia Guidelines  Per Hospital Policy        Comments: Every 3 Minutes x 20 Minutes following epidural dosing, then if stable every 15 Minutes    01/10/23 0532    01/10/23 0533  Start IV (16 or 18 Gauge)  Once         01/10/23 0532    01/10/23 0533  Fetal Heart Rate Monitor  Once         01/10/23 0532    01/10/23 0533  Nurse or Anesthesiologist to Remain With Patient for 15 Minutes Following Dosing  Continuous         01/10/23 0532    01/10/23 0533  Facilitate Maternal Position on Side & Maintain Uterine Displacement  Continuous         01/10/23 0532    01/10/23 0533  Consult Anesthesia Prior to Changing Epidural Infusion / Rate  Continuous         01/10/23 0532    01/10/23 0532  lactated ringers bolus 1,000 mL  As Needed         01/10/23 0532    01/10/23 0532  ePHEDrine Sulfate 5 MG/ML injection 10 mg  Every 10 Minutes PRN         01/10/23 0532    01/10/23 0532  diphenhydrAMINE (BENADRYL) injection 12.5 mg  Every 8 Hours PRN         01/10/23 0532    01/10/23 0532  metoclopramide (REGLAN) injection 10 mg  Once As Needed         01/10/23 0532    01/10/23 0532  ondansetron (ZOFRAN) injection 4 mg  Once As Needed         01/10/23 0532    01/10/23 0532  famotidine (PEPCID) injection 20 mg  Once As Needed         01/10/23 0532    01/10/23 0342  butorphanol (STADOL) injection 2 mg   Every 2 Hours PRN         01/10/23 0342    01/10/23 0045  oxytocin (PITOCIN) 30 units in 0.9% sodium chloride 500 mL (premix)  Titrated         01/09/23 2347    01/09/23 1745  lactated ringers infusion  Continuous         01/09/23 1647    01/09/23 1745  mineral oil liquid 30 mL  Once         01/09/23 1647    01/09/23 1647  Del Castillo Bulb Cervical Ripening Without Infusion  Once        Comments: Have Laborist Place Cervical Del Castillo Without Infusion.    If Unable to Place Del Castillo, Start Low Dose Pitocin Drip Overnight, Then Increase Per Protocol at 0500 Next Morning    01/09/23 1647    01/09/23 1646  ondansetron (ZOFRAN) tablet 4 mg  Every 6 Hours PRN        See Hyperspace for full Linked Orders Report.    01/09/23 1647    01/09/23 1646  ondansetron (ZOFRAN) injection 4 mg  Every 6 Hours PRN        See Hyperspace for full Linked Orders Report.    01/09/23 1647    01/09/23 1646  Admit To Obstetrics Inpatient  Once         01/09/23 1647    01/09/23 1646  Obtain Informed Consent  Once         01/09/23 1647    01/09/23 1646  Vital Signs Per Hospital Policy  Per Hospital Policy         01/09/23 1647    01/09/23 1646  Mini-Prep Prior to Delivery  Once         01/09/23 1647    01/09/23 1646  Continuous Fetal Monitoring With NST on Admission and Prior to Initiation of Oxytocin.  Per Order Details        Comments: Continuous Fetal Monitoring With NST on Admission & Prior to Initiation of Oxytocin.    01/09/23 1647    01/09/23 1646  External Uterine Contraction Monitoring  Per Hospital Policy         01/09/23 1647    01/09/23 1646  Notify Provider (Specified)  Until Discontinued         01/09/23 1647    01/09/23 1646  Notify Provider of Tachysystole (Per Hospital Algorithm)  Until Discontinued         01/09/23 1647    01/09/23 1646  Notify Provider if Membranes Ruptured, Bleeding Greater Than 1 Pad Per Hour, Fetal Heart Tone Abnormality or Severe Pain  Until Discontinued         01/09/23 1647    01/09/23 1646  Initiate Group Beta Strep  (GBS) Prophylaxis Protocol, If Criteria Met  Continuous        Comments: NO TREATMENT RECOMMENDED IF: 1) Maternal GBS Status Known Negative 2) Scheduled  Birth With Intact Membranes, Not in Labor 3) Maternal GBS Status Unknown, No Risk Factors  TREAT WITH ANTIBIOTICS IF:  1) Maternal GBS Status Known Positive 2) Maternal GBS Status Unknown With Risk Factors: a)  Previous Infant Affected By GBS Infection b) GBS Urinary Tract Infection (UTI) or Bacteriuria During Pregnancy c) Unexplained Maternal Fever (100.4F (38C) or Greater) During Labor d)  Prolonged Rupture of Membranes (18 or More Hours) e) Gestational Age Less Than 37 Weeks    23 164  NPO Diet NPO Type: Ice Chips  Diet Effective Now,   Status:  Canceled         23 16423 164  VTE Prophylaxis Not Indicated: Reduced Mobility (3); </= 3 (Low Risk)  Once         23 16423 164  lactated ringers bolus 1,000 mL  Once As Needed         23 1647    23 1524  ABO RH Specimen Verification  STAT         23 1523    23 1445  Type & Screen  STAT         23 1445    23 1445  CBC (No Diff)  STAT         23 1445    23 1445  Preeclampsia Panel  STAT         23 1445    23 1155  oxytocin (PITOCIN) 30 units in 0.9% sodium chloride 500 mL (premix)  Titrated,   Status:  Discontinued         23 164    Unscheduled  Position Change - For Intra-Uterine Resusitation for Hypertonus, HyperStimulation or Non-Reassuring Fetal Status  As Needed       23 1647              Operative/Procedure Notes (last 48 hours)  Notes from 23 1330 through 01/10/23 1330   No notes of this type exist for this encounter.          Physician Progress Notes (last 48 hours)      Wally Castellanos DO at 23 1833          27 y.o.  at 38w   OB History        1    Para        Term                AB        Living           SAB        IAB        Ectopic         Molar        Multiple        Live Births                 Presents as an induction of labor due to gestational hypertension unfavorable cervix  Her primary OB requests a Del Castillo Bulb placement to initiate the induction of labor.    Fetal Heart Rate Assessment   Method:     Beats/min:     Baseline:     Varibility:     Accels:     Decels:     Tracing Category:       TOCO  SVE 2 cm/-1 vertex    A Del Castillo Bulb was placed without difficulties with 60 mL of sterile saline.  The patient tolerated the procedure well.    Electronically signed by Wally Castellanos DO at 01/09/23 4848

## 2023-01-10 NOTE — ANESTHESIA PREPROCEDURE EVALUATION
Anesthesia Evaluation     Patient summary reviewed and Nursing notes reviewed   NPO Solid Status: > 6 hours  NPO Liquid Status: < 2 hours           Airway   Mallampati: I  TM distance: >3 FB  Neck ROM: full  No difficulty expected  Dental      Pulmonary - negative pulmonary ROS   Cardiovascular     (+) hypertension well controlled,       Neuro/Psych- negative ROS  GI/Hepatic/Renal/Endo - negative ROS     Musculoskeletal (-) negative ROS    Abdominal    Substance History - negative use     OB/GYN    (+) Pregnant, pregnancy induced hypertension        Other                        Anesthesia Plan    ASA 2     epidural       Anesthetic plan, risks, benefits, and alternatives have been provided, discussed and informed consent has been obtained with: patient.        CODE STATUS:

## 2023-01-11 LAB
BASOPHILS # BLD AUTO: 0.02 10*3/MM3 (ref 0–0.2)
BASOPHILS NFR BLD AUTO: 0.2 % (ref 0–1.5)
DEPRECATED RDW RBC AUTO: 46.9 FL (ref 37–54)
EOSINOPHIL # BLD AUTO: 0.04 10*3/MM3 (ref 0–0.4)
EOSINOPHIL NFR BLD AUTO: 0.4 % (ref 0.3–6.2)
ERYTHROCYTE [DISTWIDTH] IN BLOOD BY AUTOMATED COUNT: 17.4 % (ref 12.3–15.4)
HCT VFR BLD AUTO: 26.9 % (ref 34–46.6)
HGB BLD-MCNC: 8.4 G/DL (ref 12–15.9)
IMM GRANULOCYTES # BLD AUTO: 0.03 10*3/MM3 (ref 0–0.05)
IMM GRANULOCYTES NFR BLD AUTO: 0.3 % (ref 0–0.5)
LYMPHOCYTES # BLD AUTO: 1.42 10*3/MM3 (ref 0.7–3.1)
LYMPHOCYTES NFR BLD AUTO: 14.9 % (ref 19.6–45.3)
MCH RBC QN AUTO: 23.6 PG (ref 26.6–33)
MCHC RBC AUTO-ENTMCNC: 31.2 G/DL (ref 31.5–35.7)
MCV RBC AUTO: 75.6 FL (ref 79–97)
MONOCYTES # BLD AUTO: 0.55 10*3/MM3 (ref 0.1–0.9)
MONOCYTES NFR BLD AUTO: 5.8 % (ref 5–12)
NEUTROPHILS NFR BLD AUTO: 7.5 10*3/MM3 (ref 1.7–7)
NEUTROPHILS NFR BLD AUTO: 78.4 % (ref 42.7–76)
NRBC BLD AUTO-RTO: 0 /100 WBC (ref 0–0.2)
PLATELET # BLD AUTO: 118 10*3/MM3 (ref 140–450)
RBC # BLD AUTO: 3.56 10*6/MM3 (ref 3.77–5.28)
WBC NRBC COR # BLD: 9.56 10*3/MM3 (ref 3.4–10.8)

## 2023-01-11 PROCEDURE — 0 MAGNESIUM SULFATE 20 GM/500ML SOLUTION: Performed by: OBSTETRICS & GYNECOLOGY

## 2023-01-11 PROCEDURE — 0503F POSTPARTUM CARE VISIT: CPT | Performed by: OBSTETRICS & GYNECOLOGY

## 2023-01-11 PROCEDURE — 85025 COMPLETE CBC W/AUTO DIFF WBC: CPT | Performed by: OBSTETRICS & GYNECOLOGY

## 2023-01-11 RX ORDER — LABETALOL 200 MG/1
200 TABLET, FILM COATED ORAL EVERY 8 HOURS SCHEDULED
Status: DISCONTINUED | OUTPATIENT
Start: 2023-01-12 | End: 2023-01-12 | Stop reason: HOSPADM

## 2023-01-11 RX ORDER — LABETALOL 200 MG/1
200 TABLET, FILM COATED ORAL EVERY 12 HOURS SCHEDULED
Status: DISCONTINUED | OUTPATIENT
Start: 2023-01-11 | End: 2023-01-11

## 2023-01-11 RX ADMIN — DOCUSATE SODIUM 100 MG: 100 CAPSULE, LIQUID FILLED ORAL at 15:34

## 2023-01-11 RX ADMIN — IBUPROFEN 600 MG: 600 TABLET ORAL at 07:35

## 2023-01-11 RX ADMIN — DOCUSATE SODIUM 100 MG: 100 CAPSULE, LIQUID FILLED ORAL at 20:04

## 2023-01-11 RX ADMIN — SODIUM CHLORIDE, POTASSIUM CHLORIDE, SODIUM LACTATE AND CALCIUM CHLORIDE 125 ML/HR: 600; 310; 30; 20 INJECTION, SOLUTION INTRAVENOUS at 00:36

## 2023-01-11 RX ADMIN — IBUPROFEN 600 MG: 600 TABLET ORAL at 15:34

## 2023-01-11 RX ADMIN — LABETALOL HYDROCHLORIDE 200 MG: 200 TABLET, FILM COATED ORAL at 20:04

## 2023-01-11 RX ADMIN — MAGNESIUM SULFATE HEPTAHYDRATE 2 G/HR: 40 INJECTION, SOLUTION INTRAVENOUS at 05:32

## 2023-01-11 NOTE — PROGRESS NOTES
1/11/2023  PPD #1    Delivering MD:   Wendi Miranda     Subjective   Susan feels well.  Patient describes her bleeding as thin lochia.   Pain is well controlled  Breastfeeding: infant latching.     Objective   Temp: Temp:  [97.4 °F (36.3 °C)-98.4 °F (36.9 °C)] 98.1 °F (36.7 °C) Temp src: Oral   BP: BP: (115-154)/(65-96) 130/89        Pulse: Heart Rate:  [] 100  RR: Resp:  [14-18] 18    General:  well developed; well nourished  no acute distress   Abdomen: soft, non-tender; no masses  no umbilical or inguinal hernias are present  no hepato-splenomegaly   Pelvis: Clinical staff was present for exam     Lab Results   Component Value Date    WBC 9.56 01/11/2023    HGB 8.4 (L) 01/11/2023    HCT 26.9 (L) 01/11/2023    MCV 75.6 (L) 01/11/2023     (L) 01/11/2023    HEPBSAG Negative 06/09/2022       Assessment  1. PPD# 1 after vaginal delivery   2. PIH s/p magnesium 24 hours    Plan  1. Start antihypertensive and tt MBU  2. Desires circ.  3. Hemodynamically stable.  4. Rhogam was not indicated      This note has been electronically signed.    Wendi Miranda MD  January 11, 2023  14:38 EST

## 2023-01-11 NOTE — LACTATION NOTE
01/10/23 1515   Maternal Information   Date of Referral 01/10/23   Person Making Referral lactation consultant  (courtesy visit, newly postpartum)   Maternal Reason for Referral no prior breastfeeding experience   Infant Reason for Referral  infant   Maternal Assessment   Breast Size Issue none   Breast Shape Bilateral:;round   Breast Density Bilateral:;soft   Nipples Bilateral:;short;scarring;other (see comments)  (previous nipple piercing, sensitive nipples)   Left Nipple Symptoms intact;nontender   Right Nipple Symptoms intact;nontender   Maternal Infant Feeding   Maternal Emotional State receptive   Infant Positioning clutch/football;cross-cradle  (RFB LCC)   Signs of Milk Transfer deep jaw excursions noted   Pain with Feeding no  (per pt report)   Comfort Measures Before/During Feeding infant position adjusted;latch adjusted;maternal position adjusted;suction broken using finger;other (see comments)  (small nipple shield with proper placement education/demonstrated completed due to pt reporting sensitivity)   Latch Assistance full assistance needed   Support Person Involvement invited to assist with feeding   Milk Expression/Equipment   Breast Pump Type double electric, personal  (Smith Harsha)   Breast Pumping   Breast Pumping Interventions other (see comments)  (for short/missed feedings, if supplementation is required, or if breastfeeding becomes too painful to encourage breastmilk production)     Completed breastfeeding education encouraging pt to achieve a deep, comfortable latch throughout breastfeeding, which should be at least every 3 hours while giving baby stimulation for high quality transfer of breastmilk.PRN Lactation Consultant/Clinic contact encouraged.

## 2023-01-12 VITALS
OXYGEN SATURATION: 97 % | RESPIRATION RATE: 16 BRPM | HEIGHT: 62 IN | BODY MASS INDEX: 25.03 KG/M2 | HEART RATE: 78 BPM | SYSTOLIC BLOOD PRESSURE: 142 MMHG | WEIGHT: 136 LBS | DIASTOLIC BLOOD PRESSURE: 83 MMHG | TEMPERATURE: 98.9 F

## 2023-01-12 PROCEDURE — 0503F POSTPARTUM CARE VISIT: CPT | Performed by: NURSE PRACTITIONER

## 2023-01-12 RX ORDER — LABETALOL 200 MG/1
200 TABLET, FILM COATED ORAL EVERY 8 HOURS SCHEDULED
Qty: 90 TABLET | Refills: 1 | Status: SHIPPED | OUTPATIENT
Start: 2023-01-12

## 2023-01-12 RX ORDER — IBUPROFEN 600 MG/1
600 TABLET ORAL EVERY 6 HOURS PRN
Qty: 60 TABLET | Refills: 0 | Status: SHIPPED | OUTPATIENT
Start: 2023-01-12 | End: 2023-02-21

## 2023-01-12 RX ADMIN — DOCUSATE SODIUM 100 MG: 100 CAPSULE, LIQUID FILLED ORAL at 08:45

## 2023-01-12 RX ADMIN — LABETALOL HYDROCHLORIDE 200 MG: 200 TABLET, FILM COATED ORAL at 06:19

## 2023-01-12 NOTE — DISCHARGE SUMMARY
Discharge Summary    Date of Admission: 2023  Date of Discharge:  2023      Patient: Susan Dickinson      MR#:[id number]    Delivery Provider: Wendi Miranda     Attending Provider: Wendi Miranda MD    Presenting Problem/History of Present Illness  Gestational HTN [O13.9]     Patient Active Problem List   Diagnosis   • Essential hypertension   • Maternal iron deficiency anemia affecting pregnancy in second trimester, antepartum   • Encounter for supervision of normal first pregnancy in third trimester   • Gestational HTN   •  (spontaneous vaginal delivery)         Discharge Diagnosis: Vaginal delivery at 38w1d    Procedures:  Vaginal, Spontaneous     1/10/2023    10:04 AM        Discharge Date: 2023;     Hospital Course  Patient is a 27 y.o. female  at 38w1d status post vaginal delivery without complication. Postpartum the patient did well. She remained afebrile, with vital signs stable. She was ready for discharge on postpartum day 2.     Infant:   male  fetus 3210 g (7 lb 1.2 oz)  with Apgar scores of 8 , 9  at five minutes.    Condition on Discharge:  Stable    Vital Signs  Temp:  [97.8 °F (36.6 °C)-98.9 °F (37.2 °C)] 98.9 °F (37.2 °C)  Heart Rate:  [] 78  Resp:  [16-18] 16  BP: (130-169)/(77-92) 142/83    Lab Results   Component Value Date    WBC 9.56 2023    HGB 8.4 (L) 2023    HCT 26.9 (L) 2023    MCV 75.6 (L) 2023     (L) 2023       Discharge Disposition  Home or Self Care    Discharge Medications     Discharge Medications      New Medications      Instructions Start Date   ibuprofen 600 MG tablet  Commonly known as: ADVIL,MOTRIN   600 mg, Oral, Every 6 Hours PRN      labetalol 200 MG tablet  Commonly known as: NORMODYNE   200 mg, Oral, Every 8 Hours Scheduled         Continue These Medications      Instructions Start Date   ferrous sulfate 140 (45 Fe) MG tablet controlled-release tablet   Oral, 3 Times Daily With Meals      prenatal  (CLASSIC) vitamin  tablet  Generic drug: prenatal vitamin   Oral, Daily             Discharge Diet:     Activity at Discharge:   Activity Instructions     Measure Blood Pressure      Sexual Activity Restrictions      Type of Restriction: Sex    Explain Sexual Activity Restrictions: Pelvic rest for 6 weeks    Work Restrictions      Type of Restriction: Work    May Return to Work: Other    Return to Work Instructions: May return to work after 6 weeks          Follow-up Appointments  Future Appointments   Date Time Provider Department Center   1/20/2023  1:10 PM Wendi Miranda MD [facility]   1/26/2023  8:40 AM Wendi Miranda MD [facility]     Additional Instructions for the Follow-ups that You Need to Schedule     Call MD With Problems / Concerns   As directed      Instructions: Call for temp > 100.3, severe pain, severe bleeding, headache that does not improve with medication, blurred vision, or postpartum depression.    Order Comments: Instructions: Call for temp > 100.3, severe pain, severe bleeding, headache that does not improve with medication, blurred vision, or postpartum depression.          Discharge Follow-up with Specified Provider: FU Monday 1/16/23 for a BP check with an APRN   As directed      To: FU Monday 1/16/23 for a BP check with an APRN    Follow Up Details: Monday 1/16/23 for BP check               JOLENE Cotton  01/12/23  10:22 SAURABH Armenta

## 2023-01-12 NOTE — PAYOR COMM NOTE
"Chace Dickinson (27 y.o. Female) Notification of discharge  [id number]  BABY HOME WITH MOM      Date of Birth   1995    Social Security Number       Address   [address] [place], [place] [address]    Home Phone   [phone]    MRN   [id number]       Anglican   Confucianist    Marital Status   Single                            Admission Date   1/9/23    Admission Type   Elective    Admitting Provider   Wendi Miranda MD    Attending Provider       Department, Room/Bed   [facility] MOTHER BABY 4B, N425/1       Discharge Date   1/12/2023    Discharge Disposition   Home or Self Care    Discharge Destination                               Attending Provider: (none)   Allergies: No Known Allergies    Isolation: None   Infection: None   Code Status: Prior    Ht: 157.5 cm (62\")   Wt: 61.7 kg (136 lb)    Admission Cmt: None   Principal Problem: Gestational HTN [O13.9]                 Active Insurance as of 1/9/2023     Primary Coverage     Payor Plan Insurance Group Employer/Plan Group    HUMANA HUMANA [id number]     Payor Plan Address Payor Plan Phone Number Payor Plan Fax Number Effective Dates    [address] [address] [phone]  1/1/2022 - None Entered    [facility] [address]       Subscriber Name Subscriber Birth Date Member ID       CHACE DICKINSON 1995 [id number]           Secondary Coverage     Payor Plan Insurance Group Employer/Plan Group    [facility] COMMUNITY PLAN [facility] COMMUNITY PLAN [facility] [id number]     Payor Plan Address Payor Plan Phone Number Payor Plan Fax Number Effective Dates    [address]   6/1/2022 - None Entered    [facility] [address]       Subscriber Name Subscriber Birth Date Member ID       CHACE DICKINSON 1995 [id number]                 Emergency Contacts      (Rel.) Home Phone Work Phone Mobile Phone    FILIPPO DICKINSON (Mother) -- -- [phone]    Adan Guajardo (Partner) -- -- [phone]    Genesis Snyder (Relative) -- -- [phone]    "         Insurance Information                HUMANA/HUMANA Phone: [phone]    Subscriber: Susan Dickinson Subscriber#: [id number]    Group#: [id number] Precert#: --        [facility] [place] [place] Phone: --    Subscriber: Susan Dickinson Subscriber#: [id number]    Group#: [id number] Precert#: --             Discharge Summary      Connor, JOLENE Sharma at 23 1022     Attestation signed by Wendi Miranda MD at 23 1451    I have reviewed this documentation and agree.                  Discharge Summary    Date of Admission: 2023  Date of Discharge:  2023      Patient: Susan Dickinson      MR#:[id number]    Delivery Provider: Wendi Miranda     Attending Provider: Wendi Miranda MD    Presenting Problem/History of Present Illness  Gestational HTN [O13.9]     Patient Active Problem List   Diagnosis   • Essential hypertension   • Maternal iron deficiency anemia affecting pregnancy in second trimester, antepartum   • Encounter for supervision of normal first pregnancy in third trimester   • Gestational HTN   •  (spontaneous vaginal delivery)         Discharge Diagnosis: Vaginal delivery at 38w1d    Procedures:  Vaginal, Spontaneous     1/10/2023    10:04 AM        Discharge Date: 2023;     Hospital Course  Patient is a 27 y.o. female  at 38w1d status post vaginal delivery without complication. Postpartum the patient did well. She remained afebrile, with vital signs stable. She was ready for discharge on postpartum day 2.     Infant:   male  fetus 3210 g (7 lb 1.2 oz)  with Apgar scores of 8 , 9  at five minutes.    Condition on Discharge:  Stable    Vital Signs  Temp:  [97.8 °F (36.6 °C)-98.9 °F (37.2 °C)] 98.9 °F (37.2 °C)  Heart Rate:  [] 78  Resp:  [16-18] 16  BP: (130-169)/(77-92) 142/83    Lab Results   Component Value Date    WBC 9.56 2023    HGB 8.4 (L) 2023    HCT 26.9 (L) 2023    MCV 75.6 (L) 2023      (L) 01/11/2023       Discharge Disposition  Home or Self Care    Discharge Medications     Discharge Medications      New Medications      Instructions Start Date   ibuprofen 600 MG tablet  Commonly known as: ADVIL,MOTRIN   600 mg, Oral, Every 6 Hours PRN      labetalol 200 MG tablet  Commonly known as: NORMODYNE   200 mg, Oral, Every 8 Hours Scheduled         Continue These Medications      Instructions Start Date   ferrous sulfate 140 (45 Fe) MG tablet controlled-release tablet   Oral, 3 Times Daily With Meals      prenatal (CLASSIC) vitamin  tablet  Generic drug: prenatal vitamin   Oral, Daily             Discharge Diet:     Activity at Discharge:   Activity Instructions     Measure Blood Pressure      Sexual Activity Restrictions      Type of Restriction: Sex    Explain Sexual Activity Restrictions: Pelvic rest for 6 weeks    Work Restrictions      Type of Restriction: Work    May Return to Work: Other    Return to Work Instructions: May return to work after 6 weeks          Follow-up Appointments  Future Appointments   Date Time Provider Department Center   1/20/2023  1:10 PM Wendi Miranda MD [facility]   1/26/2023  8:40 AM Wendi Miranda MD [facility]     Additional Instructions for the Follow-ups that You Need to Schedule     Call MD With Problems / Concerns   As directed      Instructions: Call for temp > 100.3, severe pain, severe bleeding, headache that does not improve with medication, blurred vision, or postpartum depression.    Order Comments: Instructions: Call for temp > 100.3, severe pain, severe bleeding, headache that does not improve with medication, blurred vision, or postpartum depression.          Discharge Follow-up with Specified Provider: FU Monday 1/16/23 for a BP check with an APRN   As directed      To: FU Monday 1/16/23 for a BP check with an APRN    Follow Up Details: Monday 1/16/23 for BP check               JOLENE Cotton  01/12/23  10:22  EST  Csd    Electronically signed by Wendi Miranda MD at 01/12/23 [id number]

## 2023-01-12 NOTE — PAYOR COMM NOTE
"Chace Dickinson (27 y.o. Female) Notification of discharge  [id number]    Date of Birth   1995    Social Security Number       Address   [address] [place], [place] [address]    Home Phone   [phone]    MRN   [id number]       Synagogue   Sabianism    Marital Status   Single                            Admission Date   1/9/23    Admission Type   Elective    Admitting Provider   Wendi Miranda MD    Attending Provider       Department, Room/Bed   [facility] MOTHER BABY 4B, N425/1       Discharge Date   1/12/2023    Discharge Disposition   Home or Self Care    Discharge Destination                               Attending Provider: (none)   Allergies: No Known Allergies    Isolation: None   Infection: None   Code Status: Prior    Ht: 157.5 cm (62\")   Wt: 61.7 kg (136 lb)    Admission Cmt: None   Principal Problem: Gestational HTN [O13.9]                 Active Insurance as of 1/9/2023     Primary Coverage     Payor Plan Insurance Group Employer/Plan Group    HUMANA HUMANA [id number]     Payor Plan Address Payor Plan Phone Number Payor Plan Fax Number Effective Dates    [address] [address] [phone]  1/1/2022 - None Entered    [facility] [address]       Subscriber Name Subscriber Birth Date Member ID       CHACE DICKINSON 1995 [id number]           Secondary Coverage     Payor Plan Insurance Group Employer/Plan Group    [facility] COMMUNITY PLAN [facility] COMMUNITY PLAN [facility] [id number]     Payor Plan Address Payor Plan Phone Number Payor Plan Fax Number Effective Dates    [address]   6/1/2022 - None Entered    [facility] [address]       Subscriber Name Subscriber Birth Date Member ID       CHACE DICKINSON 1995 [id number]                 Emergency Contacts      (Rel.) Home Phone Work Phone Mobile Phone    FILIPPO DICKINSON (Mother) -- -- [phone]    Adan Guajardo (Partner) -- -- [phone]    Genesis Snyder (Relative) -- -- [phone]            Insurance " Information                HUMANA/HUMANA Phone: [phone]    Subscriber: Susan Dickinson Subscriber#: [id number]    Group#: [id number] Precert#: --        [facility] [place] [place] Phone: --    Subscriber: Susan Dickinson Subscriber#: [id number]    [id number] Precert#: --             Discharge Summary      Geeta, JOLENE Sharma at 23 1022     Attestation signed by Wendi Miranda MD at 23 1451    I have reviewed this documentation and agree.                  Discharge Summary    Date of Admission: 2023  Date of Discharge:  2023      Patient: Susan Dickinson      MR#:[id number]    Delivery Provider: Wendi Miranda     Attending Provider: Wendi Miranda MD    Presenting Problem/History of Present Illness  Gestational HTN [O13.9]     Patient Active Problem List   Diagnosis   • Essential hypertension   • Maternal iron deficiency anemia affecting pregnancy in second trimester, antepartum   • Encounter for supervision of normal first pregnancy in third trimester   • Gestational HTN   •  (spontaneous vaginal delivery)         Discharge Diagnosis: Vaginal delivery at 38w1d    Procedures:  Vaginal, Spontaneous     1/10/2023    10:04 AM        Discharge Date: 2023;     Hospital Course  Patient is a 27 y.o. female  at 38w1d status post vaginal delivery without complication. Postpartum the patient did well. She remained afebrile, with vital signs stable. She was ready for discharge on postpartum day 2.     Infant:   male  fetus 3210 g (7 lb 1.2 oz)  with Apgar scores of 8 , 9  at five minutes.    Condition on Discharge:  Stable    Vital Signs  Temp:  [97.8 °F (36.6 °C)-98.9 °F (37.2 °C)] 98.9 °F (37.2 °C)  Heart Rate:  [] 78  Resp:  [16-18] 16  BP: (130-169)/(77-92) 142/83    Lab Results   Component Value Date    WBC 9.56 2023    HGB 8.4 (L) 2023    HCT 26.9 (L) 2023    MCV 75.6 (L) 2023     (L) 2023        Discharge Disposition  Home or Self Care    Discharge Medications     Discharge Medications      New Medications      Instructions Start Date   ibuprofen 600 MG tablet  Commonly known as: ADVIL,MOTRIN   600 mg, Oral, Every 6 Hours PRN      labetalol 200 MG tablet  Commonly known as: NORMODYNE   200 mg, Oral, Every 8 Hours Scheduled         Continue These Medications      Instructions Start Date   ferrous sulfate 140 (45 Fe) MG tablet controlled-release tablet   Oral, 3 Times Daily With Meals      prenatal (CLASSIC) vitamin  tablet  Generic drug: prenatal vitamin   Oral, Daily             Discharge Diet:     Activity at Discharge:   Activity Instructions     Measure Blood Pressure      Sexual Activity Restrictions      Type of Restriction: Sex    Explain Sexual Activity Restrictions: Pelvic rest for 6 weeks    Work Restrictions      Type of Restriction: Work    May Return to Work: Other    Return to Work Instructions: May return to work after 6 weeks          Follow-up Appointments  Future Appointments   Date Time Provider Department Center   1/20/2023  1:10 PM Wendi Miranda MD [facility]   1/26/2023  8:40 AM Wendi Miranda MD [facility]     Additional Instructions for the Follow-ups that You Need to Schedule     Call MD With Problems / Concerns   As directed      Instructions: Call for temp > 100.3, severe pain, severe bleeding, headache that does not improve with medication, blurred vision, or postpartum depression.    Order Comments: Instructions: Call for temp > 100.3, severe pain, severe bleeding, headache that does not improve with medication, blurred vision, or postpartum depression.          Discharge Follow-up with Specified Provider: FU Monday 1/16/23 for a BP check with an APRN   As directed      To: FU Monday 1/16/23 for a BP check with an APRN    Follow Up Details: Monday 1/16/23 for BP check               JOLENE Cotton  01/12/23  10:22 EST  Csd    Electronically  signed by Wendi Miranda MD at 01/12/23 1947

## 2023-01-16 ENCOUNTER — POSTPARTUM VISIT (OUTPATIENT)
Dept: OBSTETRICS AND GYNECOLOGY | Facility: CLINIC | Age: 28
End: 2023-01-16
Payer: MEDICAID

## 2023-01-16 VITALS
WEIGHT: 117.4 LBS | SYSTOLIC BLOOD PRESSURE: 134 MMHG | DIASTOLIC BLOOD PRESSURE: 80 MMHG | HEIGHT: 62 IN | BODY MASS INDEX: 21.6 KG/M2

## 2023-01-16 DIAGNOSIS — Z01.30 BLOOD PRESSURE CHECK: Primary | ICD-10-CM

## 2023-01-16 PROCEDURE — 0503F POSTPARTUM CARE VISIT: CPT

## 2023-01-16 NOTE — PROGRESS NOTES
Chief Complaint   Patient presents with   • Postpartum Care     1 week PP BP check        2 Week Postpartum Visit         Susan Dickinson is a 27 y.o.  who presents today for a 1 week postpartum check.        Vaginal, Spontaneous    Information for the patient's :  Anabela Dickinson [[id number]]   1/10/2023   male   Anabela Dickinson   3210 g (7 lb 1.2 oz)   Gestational Age: 38w1d      Baby Discharged with Mom  Delivering MD: Wendi Miranda MD.    Pregnancy complications: gestational HTN    Patient describes vaginal bleeding as moderate.  She is breast feeding. Patient denies bowel or bladder issues.    She states her home blood pressure readings have averaged 120-130s/80-90s.     She desires contraceptive methods: Abstinence     Patient denies postpartum depression. Postpartum Depression Screening Questionnaire: 0, no treatment indicated.      The additional following portions of the patient's history were reviewed and updated as appropriate: allergies, current medications, past family history, past medical history, past social history, past surgical history and problem list.      Review of Systems   HENT: Negative.  Negative for facial swelling.    Eyes: Negative for visual disturbance.   Respiratory: Negative.  Negative for shortness of breath.    Cardiovascular: Negative.  Negative for chest pain.   Gastrointestinal: Negative.  Negative for abdominal distention and abdominal pain.   Genitourinary: Positive for vaginal bleeding (light). Negative for breast lump, breast pain, pelvic pain, pelvic pressure, vaginal discharge and vaginal pain.        Breast feeding    Neurological: Negative.  Negative for dizziness.   Psychiatric/Behavioral: Negative.  Negative for suicidal ideas and depressed mood. The patient is not nervous/anxious.        I have reviewed and agree with the HPI, ROS, and historical information as entered above. Latoya Eller, APRN    Objective   /80   Ht 157.5 cm  "(62.01\")   Wt 53.3 kg (117 lb 6.4 oz)   LMP 04/18/2022   Breastfeeding No Comment: pumping  BMI 21.47 kg/m²     Physical Exam  Vitals and nursing note reviewed.   Constitutional:       Appearance: Normal appearance.   Pulmonary:      Effort: Pulmonary effort is normal.   Neurological:      Mental Status: She is alert and oriented to person, place, and time.   Psychiatric:         Mood and Affect: Mood normal.         Behavior: Behavior normal.          Assessment and Plan    Problem List Items Addressed This Visit    None  Visit Diagnoses     Blood pressure check    -  Primary    Postpartum follow-up              1. S/p Vaginal delivery, 1 weeks postpartum.  Doing well.    2. Continued pelvic rest with a return to driving and light physical activity.  3. Baby doing well.  4. Breastfeeding going well.  5. No si/sx of postpartum depression  6. Contraception: contraceptive methods: Abstinence   7. Patient states she will think about contraception options for 6 week PP visit.   Return in about 5 years (around 1/16/2028) for 6 week PP visit w/ KENNA .    Latoya Eller, APRN  01/16/2023      "

## 2023-02-21 ENCOUNTER — POSTPARTUM VISIT (OUTPATIENT)
Dept: OBSTETRICS AND GYNECOLOGY | Facility: CLINIC | Age: 28
End: 2023-02-21
Payer: COMMERCIAL

## 2023-02-21 DIAGNOSIS — O13.3 GESTATIONAL HYPERTENSION, THIRD TRIMESTER: ICD-10-CM

## 2023-02-21 PROCEDURE — 0503F POSTPARTUM CARE VISIT: CPT | Performed by: OBSTETRICS & GYNECOLOGY

## 2023-02-21 NOTE — PROGRESS NOTES
Chief Complaint   Patient presents with   • Postpartum Care       6 Week Postpartum Visit         Susan Dickinson is a 27 y.o.  who presents today for a 6 week postpartum check.     C/S: no     Vaginal, Spontaneous    Information for the patient's :  Melissa Guajardo [[id number]]   1/10/2023   male   Melissa Guajardo   3210 g (7 lb 1.2 oz)   Gestational Age: 38w1d          Baby Discharged: Discharged with Mom  Delivering Physician: Wendi Miranda MD    At the time of delivery were you diagnosed with any of the following: Gestational hypertension. Patient was on magnesium for 24 hours after delivery. Patient went home on Labetalol 200mg Q8 hours. Patient reports B/Ps running 120-125/80s at home. The small hymenal laceration is healing well. Patient describes vaginal bleeding as light to spotting.  Patient is breast feeding.  She desires contraceptive methods: Condoms for contraception.  Patient denies bowel or bladder issues.      Patient denies postpartum depression. Postpartum Depression Screening Questionnaire: 0, none treatment indicated.      Last Pap : 22. Results: negative. HPV: not done.   Last Completed Pap Smear          PAP SMEAR (Every 3 Years) Next due on 2022  LIQUID-BASED PAP SMEAR, [facility] ([facility])                  The additional following portions of the patient's history were reviewed and updated as appropriate: allergies and current medications.    Review of Systems   Constitutional: Negative.    HENT: Negative.    Eyes: Negative.    Respiratory: Negative.    Cardiovascular: Negative.    Gastrointestinal: Negative.    Endocrine: Negative.    Genitourinary: Negative.    Musculoskeletal: Negative.    Skin: Negative.    Allergic/Immunologic: Negative.    Neurological: Negative.    Hematological: Negative.    Psychiatric/Behavioral: Negative.      All other systems reviewed and are negative.     I have reviewed and agree with the HPI, ROS, and  historical information as entered above. Wendi Miranda MD    LMP 2022     Physical Exam  Vitals and nursing note reviewed. Exam conducted with a chaperone present.   Constitutional:       Appearance: She is well-developed.   HENT:      Head: Normocephalic and atraumatic.   Neck:      Thyroid: No thyroid mass or thyromegaly.   Pulmonary:      Breath sounds: No rhonchi.   Abdominal:      Palpations: Abdomen is soft. Abdomen is not rigid. There is no mass.      Tenderness: There is no abdominal tenderness. There is no guarding.      Hernia: No hernia is present.   Genitourinary:     Vagina: Normal.      Cervix: Normal.      Uterus: Normal.    Musculoskeletal:      Cervical back: Normal range of motion. No muscular tenderness.   Neurological:      Mental Status: She is alert and oriented to person, place, and time.   Psychiatric:         Behavior: Behavior normal.             Assessment and Plan    Problem List Items Addressed This Visit        Gravid and     Gestational HTN    Relevant Medications    labetalol (NORMODYNE) 200 MG tablet   Other Visit Diagnoses     Postpartum exam    -  Primary          1. S/p Vaginal delivery, 6 weeks postpartum.  Doing well.    2. Return to normal physical activity.  No pelvic restrictions.   3. Baby doing well.  4. Breastfeeding going well.  5. No si/sx of postpartum depression  6. Contraception: contraceptive methods: Condoms  7. PIH - rec to start weaning the labetalol.    Wendi Miranda MD  2023

## 2023-05-09 ENCOUNTER — PATIENT MESSAGE (OUTPATIENT)
Dept: OBSTETRICS AND GYNECOLOGY | Facility: CLINIC | Age: 28
End: 2023-05-09
Payer: COMMERCIAL

## 2023-05-09 ENCOUNTER — LAB (OUTPATIENT)
Dept: OBSTETRICS AND GYNECOLOGY | Facility: CLINIC | Age: 28
End: 2023-05-09
Payer: COMMERCIAL

## 2023-05-09 DIAGNOSIS — N93.9 VAGINAL BLEEDING: ICD-10-CM

## 2023-05-09 DIAGNOSIS — Z32.00 UNCONFIRMED PREGNANCY: Primary | ICD-10-CM

## 2023-05-09 DIAGNOSIS — Z32.00 UNCONFIRMED PREGNANCY: ICD-10-CM

## 2023-05-10 LAB
HCG INTACT+B SERPL-ACNC: 147 MIU/ML
PROGEST SERPL-MCNC: 3.3 NG/ML

## 2023-05-11 ENCOUNTER — LAB (OUTPATIENT)
Dept: OBSTETRICS AND GYNECOLOGY | Facility: CLINIC | Age: 28
End: 2023-05-11
Payer: COMMERCIAL

## 2023-05-11 DIAGNOSIS — N93.9 VAGINAL BLEEDING: ICD-10-CM

## 2023-05-11 DIAGNOSIS — Z32.00 UNCONFIRMED PREGNANCY: ICD-10-CM

## 2023-05-11 RX ORDER — PROGESTERONE 200 MG/1
200 CAPSULE ORAL NIGHTLY
Qty: 30 CAPSULE | Refills: 1 | Status: SHIPPED | OUTPATIENT
Start: 2023-05-11

## 2023-05-12 ENCOUNTER — TELEPHONE (OUTPATIENT)
Dept: OBSTETRICS AND GYNECOLOGY | Facility: CLINIC | Age: 28
End: 2023-05-12
Payer: COMMERCIAL

## 2023-05-12 LAB — HCG INTACT+B SERPL-ACNC: 131 MIU/ML

## 2023-05-12 NOTE — PROGRESS NOTES
Unfortunately the level fell.  Ectopic still possible.  Repeat on Monday am.  As long as falling can watch.  If goes back up needs to be seen asap and have MTX.

## 2023-05-15 NOTE — TELEPHONE ENCOUNTER
From: Susan Dickinson  Sent: 5/15/2023 3:00 PM EDT  To: Ervin Evangelista [facility] [address] [facility] Clinical Pool  Subject: Possible Miscarriage    Looking at my last blood results- likely a miscarriage?

## 2023-05-15 NOTE — TELEPHONE ENCOUNTER
Called pt with HCG results. Pt will rto tomorrow for repeat HCG to make sure it continues to decrease per Miranda. Pt denies any abdominal pain or heavy bleeding.

## 2023-05-17 NOTE — TELEPHONE ENCOUNTER
Joseph Davis,    I tried to call you in regards to your HCG results. Please give our office a call back at [phone].

## 2023-05-19 ENCOUNTER — TELEPHONE (OUTPATIENT)
Dept: OBSTETRICS AND GYNECOLOGY | Facility: CLINIC | Age: 28
End: 2023-05-19
Payer: COMMERCIAL

## 2023-05-19 ENCOUNTER — OFFICE VISIT (OUTPATIENT)
Dept: OBSTETRICS AND GYNECOLOGY | Facility: CLINIC | Age: 28
End: 2023-05-19
Payer: COMMERCIAL

## 2023-05-19 ENCOUNTER — TELEPHONE (OUTPATIENT)
Dept: OBSTETRICS AND GYNECOLOGY | Facility: CLINIC | Age: 28
End: 2023-05-19

## 2023-05-19 VITALS
DIASTOLIC BLOOD PRESSURE: 92 MMHG | WEIGHT: 102.6 LBS | SYSTOLIC BLOOD PRESSURE: 128 MMHG | BODY MASS INDEX: 18.88 KG/M2 | HEIGHT: 62 IN

## 2023-05-19 DIAGNOSIS — O02.81 INAPPROPRIATE CHANGE IN QUANTITATIVE HCG IN EARLY PREGNANCY: Primary | ICD-10-CM

## 2023-05-19 DIAGNOSIS — O00.202 LEFT OVARIAN PREGNANCY WITHOUT INTRAUTERINE PREGNANCY: ICD-10-CM

## 2023-05-19 DIAGNOSIS — Z30.011 ENCOUNTER FOR INITIAL PRESCRIPTION OF CONTRACEPTIVE PILLS: Primary | ICD-10-CM

## 2023-05-19 LAB
ABO GROUP BLD: NORMAL
ALBUMIN SERPL-MCNC: 4.7 G/DL (ref 3.5–5.2)
ALBUMIN/GLOB SERPL: 2.1 G/DL
ALP SERPL-CCNC: 89 U/L (ref 39–117)
ALT SERPL-CCNC: 16 U/L (ref 1–33)
AST SERPL-CCNC: 20 U/L (ref 1–32)
BILIRUB SERPL-MCNC: 0.4 MG/DL (ref 0–1.2)
BUN SERPL-MCNC: 10 MG/DL (ref 6–20)
BUN/CREAT SERPL: 13.5 (ref 7–25)
CALCIUM SERPL-MCNC: 9.8 MG/DL (ref 8.6–10.5)
CHLORIDE SERPL-SCNC: 104 MMOL/L (ref 98–107)
CO2 SERPL-SCNC: 24 MMOL/L (ref 22–29)
CREAT SERPL-MCNC: 0.74 MG/DL (ref 0.57–1)
EGFRCR SERPLBLD CKD-EPI 2021: 115.3 ML/MIN/1.73
ERYTHROCYTE [DISTWIDTH] IN BLOOD BY AUTOMATED COUNT: 13.3 % (ref 12.3–15.4)
GLOBULIN SER CALC-MCNC: 2.2 GM/DL
GLUCOSE SERPL-MCNC: 75 MG/DL (ref 65–99)
HCG INTACT+B SERPL-ACNC: 231 MIU/ML
HCG INTACT+B SERPL-ACNC: 232 MIU/ML
HCT VFR BLD AUTO: 35.4 % (ref 34–46.6)
HGB BLD-MCNC: 11.1 G/DL (ref 12–15.9)
MCH RBC QN AUTO: 26.7 PG (ref 26.6–33)
MCHC RBC AUTO-ENTMCNC: 31.4 G/DL (ref 31.5–35.7)
MCV RBC AUTO: 85.1 FL (ref 79–97)
PLATELET # BLD AUTO: 232 10E3/MM3 (ref 140–450)
POTASSIUM SERPL-SCNC: 3.6 MMOL/L (ref 3.5–5.2)
PROT SERPL-MCNC: 6.9 G/DL (ref 6–8.5)
RBC # BLD AUTO: 4.16 10E6/MM3 (ref 3.77–5.28)
RH BLD: POSITIVE
SODIUM SERPL-SCNC: 140 MMOL/L (ref 136–145)
WBC # BLD AUTO: 6.32 10E3/MM3 (ref 3.4–10.8)

## 2023-05-19 RX ORDER — METHOTREXATE 25 MG/ML
70 INJECTION INTRA-ARTERIAL; INTRAMUSCULAR; INTRATHECAL; INTRAVENOUS ONCE
Qty: 4 ML | Refills: 0 | Status: SHIPPED | OUTPATIENT
Start: 2023-05-19 | End: 2023-05-20

## 2023-05-19 NOTE — TELEPHONE ENCOUNTER
Pharmacist calling to confirm dosage for methotrexate injection.     [facility]

## 2023-05-19 NOTE — TELEPHONE ENCOUNTER
"Explained patient history and situation to pharmacist. [facility] recommends APRN sending mtx prescription with note that she \"acknowledges ok to fill today\". Patient name and  given so he could look out for this case upon receipt of order.  "

## 2023-05-19 NOTE — PROGRESS NOTES
"      Chief Complaint   Patient presents with   • Follow-up     Possible ectopic pregnancy            HPI  Susan Dickinson is a 27 y.o. female, , who presents for possible ectopic pregnancy.    Recent Tests:  She had a urine pregnancy test that was done 3 weeks ago that was positive. She has had hCG draws since then that were declining, then started to rise again with the most recent draw. She has not had methotrexate. She had a pelvic ultrasound today and the findings were ectopic pregnancy on left adnexa. The patient denies associated abdominal or pelvic pain. Her past medical history is non-contributory.    Rh Status: Positive  She reports no additional symptoms or complaints from earlier phone call (light brown discharge with wiping).    The additional following portions of the patient's history were reviewed and updated as appropriate: allergies, current medications, past family history, past medical history, past social history, past surgical history and problem list.  Review of Systems  Review of Systems   Constitutional: Negative.    HENT: Negative.    Eyes: Negative.    Respiratory: Negative.    Cardiovascular: Negative.    Gastrointestinal: Negative.    Endocrine: Negative.    Genitourinary:        Ectopic pregnancy   Musculoskeletal: Negative.    Skin: Negative.    Allergic/Immunologic: Negative.    Neurological: Negative.    Hematological: Negative.    Psychiatric/Behavioral: Negative.         I have reviewed and agree with the HPI, ROS, and historical information as entered above. Melissa Guzman, APRN    Objective   Physical Exam  /92 (BP Location: Right arm, Patient Position: Sitting, Cuff Size: Adult)   Ht 157.5 cm (62\")   Wt 46.5 kg (102 lb 9.6 oz)   LMP 2023 (Exact Date)   BMI 18.77 kg/m²     Physical Exam  Vitals and nursing note reviewed. Exam conducted with a chaperone present.   Constitutional:       Appearance: She is well-developed.   HENT:      Head: Normocephalic " and atraumatic.   Cardiovascular:      Rate and Rhythm: Normal rate and regular rhythm.   Pulmonary:      Effort: Pulmonary effort is normal.      Breath sounds: Normal breath sounds.   Abdominal:      Palpations: Abdomen is soft. Abdomen is not rigid.   Musculoskeletal:      Cervical back: Normal range of motion.   Skin:     General: Skin is warm and dry.   Neurological:      Mental Status: She is alert and oriented to person, place, and time.   Psychiatric:         Attention and Perception: Attention normal.         Mood and Affect: Mood normal.         Speech: Speech normal.         Behavior: Behavior normal. Behavior is cooperative.         Thought Content: Thought content normal.         Cognition and Memory: Cognition and memory normal.         Judgment: Judgment normal.         Assessment and Plan    Problem List Items Addressed This Visit    None  Visit Diagnoses     Encounter for initial prescription of contraceptive pills    -  Primary    Relevant Medications    Norethin-Eth Estrad-Fe Biphas (LO LOESTRIN FE) 1 MG-10 MCG / 10 MCG tablet    Left ovarian pregnancy without intrauterine pregnancy        Relevant Orders    HCG, B-subunit, Quantitative    HCG, B-subunit, Quantitative    US Non-ob Transvaginal      Motrin 600mg oral given @1337    Methotrexate 70mg (2.8ml)   Lot [id number]  Mfg Exp: 12/31/24  Volume 2.8ml divided into two syringes of 1.4ml each given IM in Left gluteal and right gluteal by Princess FERNÁNDEZ    1. ectopic pregnancy on left adnexa. Ultrasound showed no evidence of gestational sac, yolk sac, or fetal pole, EMC is thin and normal in appearance, Left adnexa contains a mass lateral to ovary measuring [id number], consistent with an ectopic   2. Repeat U/S in 1 week(s).  3. Pelvic Rest.  No douching, intercourse or use of tampons.  4. Call for an increase in bleeding, abdominal pain, or fever.  5. Methotrexate ordered.  Precautions reviewed. Repeat labs on day 4 and 7.  Reviewed ultrasound with  Dr. Paez.  aware as well and is ok with samples of RYAN given.  6. Serial HCG.  7. Report to ED if increase in pain or bleeding.       Melissa Guzman, APRN  05/19/2023

## 2023-05-19 NOTE — TELEPHONE ENCOUNTER
Consult[facility] with [facility]ost [facility] recent [facility] lab results. Reviewed all labs and previous notes. Received direction to schedule appointment with U/S and stat labs for hCG, CBC, CMP, and blood typing.    Patient denies pain, cramping, or bleeding. States she had vaginal bleeding 3-4 days ago which stopped. Brownish discharge began this AM. Appointment scheduled for today; U/S 1:30pm with provider visit at 1:45pm.

## 2023-05-22 ENCOUNTER — TELEPHONE (OUTPATIENT)
Dept: OBSTETRICS AND GYNECOLOGY | Facility: CLINIC | Age: 28
End: 2023-05-22
Payer: COMMERCIAL

## 2023-05-22 NOTE — TELEPHONE ENCOUNTER
Pt at [facility] in [place] and needs orders faxed to repeat labs today. Fax number [phone].

## 2023-05-26 ENCOUNTER — OFFICE VISIT (OUTPATIENT)
Dept: OBSTETRICS AND GYNECOLOGY | Facility: CLINIC | Age: 28
End: 2023-05-26
Payer: COMMERCIAL

## 2023-05-26 ENCOUNTER — DOCUMENTATION (OUTPATIENT)
Dept: OBSTETRICS AND GYNECOLOGY | Facility: CLINIC | Age: 28
End: 2023-05-26
Payer: COMMERCIAL

## 2023-05-26 VITALS — WEIGHT: 103.2 LBS | DIASTOLIC BLOOD PRESSURE: 86 MMHG | SYSTOLIC BLOOD PRESSURE: 124 MMHG | BODY MASS INDEX: 18.88 KG/M2

## 2023-05-26 DIAGNOSIS — O00.202 LEFT OVARIAN PREGNANCY WITHOUT INTRAUTERINE PREGNANCY: Primary | ICD-10-CM

## 2023-05-26 DIAGNOSIS — O00.102 LEFT TUBAL PREGNANCY WITHOUT INTRAUTERINE PREGNANCY: ICD-10-CM

## 2023-05-26 NOTE — PROGRESS NOTES
Chief Complaint   Patient presents with   • Ectopic Pregnancy            HPI  Susan Dickinson is a 27 y.o. female, , who presents for follow up ectopic pregnancy.    Recent Tests:  She had a urine pregnancy test that was done 4 weeks ago that was positive.. She has had methotrexate. It was given on 23.  She had a pelvic ultrasound today and the findings were ectopic pregnancy on left adnexa.. The patient denies associated abdominal or pelvic pain.. Her past medical history is non-contributory.    Rh Status: Positive  She reports no additional symptoms or complaints.    The additional following portions of the patient's history were reviewed and updated as appropriate: allergies, current medications, past family history, past medical history, past social history, past surgical history and problem list.  Review of Systems  Review of Systems   All other systems reviewed and are negative.       I have reviewed and agree with the HPI, ROS, and historical information as entered above.       Objective   Physical Exam  /86   Wt 46.8 kg (103 lb 3.2 oz)   LMP 2023 (Exact Date)   BMI 18.88 kg/m²     Physical Exam  Vitals and nursing note reviewed.   Constitutional:       Appearance: She is well-developed.   HENT:      Head: Normocephalic and atraumatic.   Cardiovascular:      Rate and Rhythm: Normal rate and regular rhythm.   Pulmonary:      Effort: Pulmonary effort is normal.      Breath sounds: Normal breath sounds.   Abdominal:      General: There is no distension.      Palpations: Abdomen is soft. Abdomen is not rigid.      Tenderness: There is no abdominal tenderness.   Musculoskeletal:      Cervical back: Normal range of motion.   Neurological:      Mental Status: She is alert and oriented to person, place, and time.   Psychiatric:         Behavior: Behavior normal.           Assessment and Plan    Problem List Items Addressed This Visit    None  Visit Diagnoses     Left ovarian  pregnancy without intrauterine pregnancy    -  Primary    Relevant Orders    HCG, B-subunit, Quantitative    Left tubal pregnancy without intrauterine pregnancy              1. ectopic pregnancy on left adnexa. Left tube 41.7mm x 16.7mm x 16.8mm with normal blood supply. Mixed echogeneicity free fluid in cul de sac.  Pelvic Rest.  No douching, intercourse or use of tampons.  Call for an increase in bleeding, abdominal pain, or fever.  Options discussed with patient.  Serial HCG-stat today r/t no hcg on day 7 drawn.  Report to ED if increase in pain or bleeding, reviewed with Dr. Colon.  Will call if HCG is not decreasing for patient to go to the ER for MTX.     Melissa Guzman, APRN  05/26/2023

## 2023-05-30 ENCOUNTER — OFFICE VISIT (OUTPATIENT)
Dept: OBSTETRICS AND GYNECOLOGY | Facility: CLINIC | Age: 28
End: 2023-05-30

## 2023-05-30 VITALS
BODY MASS INDEX: 19.06 KG/M2 | DIASTOLIC BLOOD PRESSURE: 58 MMHG | HEIGHT: 62 IN | SYSTOLIC BLOOD PRESSURE: 108 MMHG | WEIGHT: 103.6 LBS

## 2023-05-30 DIAGNOSIS — O00.102 LEFT TUBAL PREGNANCY WITHOUT INTRAUTERINE PREGNANCY: ICD-10-CM

## 2023-05-30 DIAGNOSIS — O00.102 LEFT TUBAL PREGNANCY WITHOUT INTRAUTERINE PREGNANCY: Primary | ICD-10-CM

## 2023-05-30 RX ORDER — METHOTREXATE 25 MG/ML
70 INJECTION INTRA-ARTERIAL; INTRAMUSCULAR; INTRATHECAL; INTRAVENOUS ONCE
Qty: 4 ML | Refills: 0 | Status: SHIPPED | OUTPATIENT
Start: 2023-05-30 | End: 2023-05-31

## 2023-05-30 RX ORDER — METHOTREXATE 25 MG/ML
70 INJECTION INTRA-ARTERIAL; INTRAMUSCULAR; INTRATHECAL; INTRAVENOUS ONCE
Qty: 4 ML | Refills: 0 | Status: SHIPPED | OUTPATIENT
Start: 2023-05-30 | End: 2023-05-30 | Stop reason: SDUPTHER

## 2023-05-30 NOTE — PROGRESS NOTES
"    Chief Complaint   Patient presents with   • Threatened Miscarriage          HPI  Susan Dickinson is a 27 y.o. female, , who presents with follow up ectopic pregnancy. Patient's HCG level on 23 was 265.4.  Patient had her first dose of methotrexate on 23. She reports she has had bleeding since her first dose of methotrexate. She states this past week, there have been some small clots and she is spotting today. Patient states she spoke with Dr. Heath and he told patient she did not need to go to the emergency room for the second dose. Pt is here today to follow up and receive second dose and have repeat Hcg..    Recent Tests:  US today: No.  She has not had prenatal care.  She denies associated abdominal or pelvic pain.. Her past medical history is non-contributory.  She believes she has had passage of tissue this week but is unsure.  Rh Status: Positive  She reports no additional symptoms or complaints.    The additional following portions of the patient's history were reviewed and updated as appropriate: allergies, current medications, past family history, past medical history, past social history, past surgical history and problem list.    Review of Systems   Constitutional: Negative.    HENT: Negative.    Eyes: Negative.    Respiratory: Negative.    Cardiovascular: Negative.    Gastrointestinal: Negative.    Endocrine: Negative.    Genitourinary: Positive for vaginal bleeding.   Musculoskeletal: Negative.    Skin: Negative.    Allergic/Immunologic: Negative.    Neurological: Negative.    Hematological: Negative.    Psychiatric/Behavioral: Negative.      All other systems reviewed and are negative.     I have reviewed and agree with the HPI, ROS, and historical information as entered above. Joe Connor, APRN      Objective   /58 (BP Location: Right arm, Patient Position: Sitting, Cuff Size: Adult)   Ht 157.5 cm (62\")   Wt 47 kg (103 lb 9.6 oz)   LMP 2023 (Exact " Date)   Breastfeeding No   BMI 18.95 kg/m²     Physical Exam  Vitals and nursing note reviewed.   Constitutional:       Appearance: Normal appearance. She is well-developed and normal weight.   HENT:      Head: Normocephalic and atraumatic.   Cardiovascular:      Rate and Rhythm: Normal rate.   Pulmonary:      Effort: Pulmonary effort is normal.   Abdominal:      Palpations: Abdomen is soft. Abdomen is not rigid.   Musculoskeletal:      Cervical back: Normal range of motion.   Neurological:      Mental Status: She is alert and oriented to person, place, and time.   Psychiatric:         Behavior: Behavior normal.            Assessment and Plan    Problem List Items Addressed This Visit    None  Visit Diagnoses     Left tubal pregnancy without intrauterine pregnancy        Relevant Medications    Methotrexate Sodium (methotrexate PF) 50 MG/2ML chemo syringe    Other Relevant Orders    HCG, B-subunit, Quantitative          1. Left ectopic pregnancy  2. Pelvic Rest.  No douching, intercourse or use of tampons.  3. Call for an increase in bleeding, abdominal pain, or fever.  4. Report to ED if saturating a pad greater than every 30-60 mins.   5. Pt to RTC Friday 6/2/23 and Monday 6/5/23 for repeat Hcg draws. 6/5 will have repeat US and see EF.  6. Methotrexate given in office today. Pt instructed on plan of care, side effects of medication. She v/u.  7. Hcg today. Last Hcg was done Friday 5/26/23 and was 265  8. MBT O +, rhogam not indicated      Counseling was given to patient for the following topics: diagnostic results and instructions for management . Total time of the encounter was 30 minutes and 15 minutes was spend counseling.      Joe Connor, APRN  05/30/2023

## 2023-05-31 LAB
HCG INTACT+B SERPL-ACNC: 165 MIU/ML
HCG INTACT+B SERPL-ACNC: 265 MIU/ML

## 2023-06-02 DIAGNOSIS — O00.102 LEFT TUBAL PREGNANCY WITHOUT INTRAUTERINE PREGNANCY: Primary | ICD-10-CM

## 2023-06-03 LAB — HCG INTACT+B SERPL-ACNC: 118 MIU/ML

## 2023-06-05 ENCOUNTER — LAB (OUTPATIENT)
Dept: LAB | Facility: HOSPITAL | Age: 28
End: 2023-06-05
Payer: COMMERCIAL

## 2023-06-05 DIAGNOSIS — O00.202 LEFT OVARIAN PREGNANCY WITHOUT INTRAUTERINE PREGNANCY: ICD-10-CM

## 2023-06-05 DIAGNOSIS — O00.102 LEFT TUBAL PREGNANCY WITHOUT INTRAUTERINE PREGNANCY: Primary | ICD-10-CM

## 2023-06-05 LAB — HCG INTACT+B SERPL-ACNC: 48.67 MIU/ML

## 2023-06-05 PROCEDURE — 84702 CHORIONIC GONADOTROPIN TEST: CPT | Performed by: OBSTETRICS & GYNECOLOGY

## 2023-06-05 PROCEDURE — 36415 COLL VENOUS BLD VENIPUNCTURE: CPT

## 2023-06-06 DIAGNOSIS — O00.102 LEFT TUBAL PREGNANCY WITHOUT INTRAUTERINE PREGNANCY: Primary | ICD-10-CM

## 2023-06-12 ENCOUNTER — LAB (OUTPATIENT)
Dept: LAB | Facility: HOSPITAL | Age: 28
End: 2023-06-12
Payer: COMMERCIAL

## 2023-06-12 DIAGNOSIS — O00.202 LEFT OVARIAN PREGNANCY WITHOUT INTRAUTERINE PREGNANCY: Primary | ICD-10-CM

## 2023-06-12 LAB — HCG INTACT+B SERPL-ACNC: 1.28 MIU/ML

## 2023-06-12 PROCEDURE — 36415 COLL VENOUS BLD VENIPUNCTURE: CPT

## 2023-06-12 PROCEDURE — 84702 CHORIONIC GONADOTROPIN TEST: CPT

## 2023-08-07 ENCOUNTER — PATIENT MESSAGE (OUTPATIENT)
Dept: OBSTETRICS AND GYNECOLOGY | Facility: CLINIC | Age: 28
End: 2023-08-07
Payer: COMMERCIAL

## 2023-08-29 RX ORDER — ETONOGESTREL AND ETHINYL ESTRADIOL 11.7; 2.7 MG/1; MG/1
1 INSERT, EXTENDED RELEASE VAGINAL
Qty: 1 EACH | Refills: 12 | Status: SHIPPED | OUTPATIENT
Start: 2023-08-29 | End: 2024-08-28

## 2023-11-09 RX ORDER — ETONOGESTREL AND ETHINYL ESTRADIOL VAGINAL .015; .12 MG/D; MG/D
1 RING VAGINAL
Qty: 1 EACH | Refills: 12 | Status: SHIPPED | OUTPATIENT
Start: 2023-11-09 | End: 2024-11-08